# Patient Record
Sex: MALE | Race: WHITE | NOT HISPANIC OR LATINO | Employment: FULL TIME | URBAN - METROPOLITAN AREA
[De-identification: names, ages, dates, MRNs, and addresses within clinical notes are randomized per-mention and may not be internally consistent; named-entity substitution may affect disease eponyms.]

---

## 2017-10-21 ENCOUNTER — HOSPITAL ENCOUNTER (OUTPATIENT)
Dept: LAB | Facility: HOSPITAL | Age: 57
Discharge: HOME | End: 2017-10-21
Attending: INTERNAL MEDICINE | Admitting: INTERNAL MEDICINE

## 2017-10-21 LAB
ALBUMIN SERPL-MCNC: 4.6 G/DL (ref 3.5–4.8)
ALBUMIN/GLOB SERPL: 1.7 {RATIO} (ref 1–1.7)
ALP SERPL-CCNC: 64 IU/L (ref 32–91)
ALT SERPL-CCNC: 26 IU/L (ref 17–63)
ANION GAP SERPL CALC-SCNC: 14.8 MMOL/L (ref 10–20)
AST SERPL-CCNC: 27 IU/L (ref 15–41)
BASOPHILS # BLD AUTO: 0 10*3/UL (ref 0–0.2)
BASOPHILS NFR BLD AUTO: 1 % (ref 0–2)
BILIRUB SERPL-MCNC: 1.4 MG/DL (ref 0.3–1.2)
BILIRUB UR QL STRIP: NEGATIVE MG/DL
BUN SERPL-MCNC: 22 MG/DL (ref 8–20)
BUN/CREAT SERPL: 15.7 (ref 6.2–20.3)
CALCIUM SERPL-MCNC: 9.8 MG/DL (ref 8.9–10.3)
CASTS URNS QL MICRO: NORMAL /[LPF]
CHLORIDE SERPL-SCNC: 98 MMOL/L (ref 101–111)
CHOLEST SERPL-MCNC: 187 MG/DL
CHOLEST/HDLC SERPL: 4.7 {RATIO}
COLOR UR: YELLOW
CONV BACTERIA IN URINE MICRO: NEGATIVE
CONV CLARITY OF URINE: CLEAR
CONV CO2: 30 MMOL/L (ref 22–32)
CONV HYALINE CASTS IN URINE MICRO: 0 /[LPF] (ref 0–5)
CONV LDL CHOLESTEROL DIRECT: 135 MG/DL (ref 0–100)
CONV MICROALBUM.,U,RANDOM: 57 MG/L
CONV PROTEIN IN URINE BY AUTOMATED TEST STRIP: NEGATIVE MG/DL
CONV SMALL ROUND CELLS: NORMAL /[HPF]
CONV TOTAL PROTEIN: 7.3 G/DL (ref 6.1–7.9)
CONV UROBILINOGEN IN URINE BY AUTOMATED TEST STRIP: 1 MG/DL
CREAT 24H UR-MCNC: 79.7 MG/DL
CREAT UR-MCNC: 1.4 MG/DL (ref 0.7–1.2)
CULTURE INDICATED?: NORMAL
DIFFERENTIAL METHOD BLD: (no result)
EOSINOPHIL # BLD AUTO: 0.1 10*3/UL (ref 0–0.3)
EOSINOPHIL # BLD AUTO: 1 % (ref 0–3)
ERYTHROCYTE [DISTWIDTH] IN BLOOD BY AUTOMATED COUNT: 13.3 % (ref 11.5–14.5)
GLOBULIN UR ELPH-MCNC: 2.7 G/DL (ref 2.5–3.8)
GLUCOSE SERPL-MCNC: 95 MG/DL (ref 65–99)
GLUCOSE UR QL: NEGATIVE MG/DL
HCT VFR BLD AUTO: 47.7 % (ref 40–54)
HDLC SERPL-MCNC: 40 MG/DL
HGB BLD-MCNC: 16.5 G/DL (ref 14–18)
HGB UR QL STRIP: NEGATIVE
KETONES UR QL STRIP: NEGATIVE MG/DL
LDLC/HDLC SERPL: 3.4 {RATIO}
LEUKOCYTE ESTERASE UR QL STRIP: NEGATIVE
LIPID INTERPRETATION: ABNORMAL
LYMPHOCYTES # BLD AUTO: 1.5 10*3/UL (ref 0.8–4.8)
LYMPHOCYTES NFR BLD AUTO: 29 % (ref 18–42)
MCH RBC QN AUTO: 32.1 PG (ref 26–32)
MCHC RBC AUTO-ENTMCNC: 34.5 G/DL (ref 32–36)
MCV RBC AUTO: 93 FL (ref 80–94)
MICROALBUMIN/CREAT UR: 71.5 UG/MG
MONOCYTES # BLD AUTO: 0.6 10*3/UL (ref 0.1–1.3)
MONOCYTES NFR BLD AUTO: 11 % (ref 2–11)
NEUTROPHILS # BLD AUTO: 3.1 10*3/UL (ref 2.3–8.6)
NEUTROPHILS NFR BLD AUTO: 58 % (ref 50–75)
NITRITE UR QL STRIP: NEGATIVE
NRBC BLD AUTO-RTO: 0 /100{WBCS}
NRBC/RBC NFR BLD MANUAL: 0 10*3/UL
PH UR STRIP.AUTO: 6.5 [PH] (ref 4.5–8)
PLATELET # BLD AUTO: 202 10*3/UL (ref 150–450)
PMV BLD AUTO: 9 FL (ref 7.4–10.4)
POTASSIUM SERPL-SCNC: 3.8 MMOL/L (ref 3.6–5.1)
PSA SERPL-MCNC: 1.85 NG/ML (ref 0–4)
RBC # BLD AUTO: 5.13 10*6/UL (ref 4.6–6)
RBC #/AREA URNS HPF: 1 /[HPF] (ref 0–3)
SODIUM SERPL-SCNC: 139 MMOL/L (ref 136–144)
SP GR UR: 1.01 (ref 1–1.03)
SPERM URNS QL MICRO: NORMAL /[HPF]
SQUAMOUS SPT QL MICRO: 0 /[HPF] (ref 0–5)
TRIGL SERPL-MCNC: 117 MG/DL
UNIDENT CRYS URNS QL MICRO: NORMAL /[HPF]
URATE SERPL-MCNC: 4.5 MG/DL (ref 4.8–8.7)
VLDLC SERPL CALC-MCNC: 12.4 MG/DL
WBC # BLD AUTO: 5.2 10*3/UL (ref 4.5–11.5)
WBC #/AREA URNS HPF: 0 /[HPF] (ref 0–5)
YEAST SPEC QL WET PREP: NORMAL /[HPF]

## 2018-05-12 ENCOUNTER — HOSPITAL ENCOUNTER (OUTPATIENT)
Dept: LAB | Facility: HOSPITAL | Age: 58
Discharge: HOME | End: 2018-05-12
Attending: INTERNAL MEDICINE | Admitting: INTERNAL MEDICINE

## 2018-05-12 LAB
ANION GAP SERPL CALC-SCNC: 12 MMOL/L (ref 10–20)
BILIRUB UR QL STRIP: NEGATIVE MG/DL
BUN SERPL-MCNC: 20 MG/DL (ref 8–20)
BUN/CREAT SERPL: 15.4 (ref 6.2–20.3)
CALCIUM SERPL-MCNC: 9.5 MG/DL (ref 8.9–10.3)
CASTS URNS QL MICRO: ABNORMAL /[LPF]
CHLORIDE SERPL-SCNC: 102 MMOL/L (ref 101–111)
COLOR UR: YELLOW
CONV BACTERIA IN URINE MICRO: NEGATIVE
CONV CLARITY OF URINE: CLEAR
CONV CO2: 29 MMOL/L (ref 22–32)
CONV HYALINE CASTS IN URINE MICRO: 0 /[LPF] (ref 0–5)
CONV PROTEIN IN URINE BY AUTOMATED TEST STRIP: 100 MG/DL
CONV SMALL ROUND CELLS: ABNORMAL /[HPF]
CONV UROBILINOGEN IN URINE BY AUTOMATED TEST STRIP: 1 MG/DL
CREAT 24H UR-MCNC: 107.4 MG/DL
CREAT UR-MCNC: 1.3 MG/DL (ref 0.7–1.2)
CULTURE INDICATED?: ABNORMAL
ERYTHROCYTE [DISTWIDTH] IN BLOOD BY AUTOMATED COUNT: 13.5 % (ref 11.5–14.5)
GLUCOSE SERPL-MCNC: 103 MG/DL (ref 65–99)
GLUCOSE UR QL: NEGATIVE MG/DL
HCT VFR BLD AUTO: 47.6 % (ref 40–54)
HGB BLD-MCNC: 16.2 G/DL (ref 14–18)
HGB UR QL STRIP: NEGATIVE
KETONES UR QL STRIP: NEGATIVE MG/DL
LEUKOCYTE ESTERASE UR QL STRIP: NEGATIVE
MCH RBC QN AUTO: 31 PG (ref 26–32)
MCHC RBC AUTO-ENTMCNC: 34 G/DL (ref 32–36)
MCV RBC AUTO: 91.3 FL (ref 80–94)
NITRITE UR QL STRIP: NEGATIVE
PH UR STRIP.AUTO: 6 [PH] (ref 4.5–8)
PLATELET # BLD AUTO: 201 10*3/UL (ref 150–450)
PMV BLD AUTO: 8.8 FL (ref 7.4–10.4)
POTASSIUM SERPL-SCNC: 4 MMOL/L (ref 3.6–5.1)
PROT UR-MCNC: 57 MG/DL
PROT/CREAT UR: 0.5 MG/MG (ref 0–22)
RBC # BLD AUTO: 5.21 10*6/UL (ref 4.6–6)
RBC #/AREA URNS HPF: 1 /[HPF] (ref 0–3)
SODIUM SERPL-SCNC: 139 MMOL/L (ref 136–144)
SP GR UR: 1.01 (ref 1–1.03)
SPERM URNS QL MICRO: ABNORMAL /[HPF]
SQUAMOUS SPT QL MICRO: 0 /[HPF] (ref 0–5)
UNIDENT CRYS URNS QL MICRO: ABNORMAL /[HPF]
WBC # BLD AUTO: 5 10*3/UL (ref 4.5–11.5)
WBC #/AREA URNS HPF: 0 /[HPF] (ref 0–5)
YEAST SPEC QL WET PREP: ABNORMAL /[HPF]

## 2018-10-20 ENCOUNTER — HOSPITAL ENCOUNTER (OUTPATIENT)
Dept: LAB | Facility: HOSPITAL | Age: 58
Discharge: HOME | End: 2018-10-20
Attending: FAMILY MEDICINE | Admitting: FAMILY MEDICINE

## 2018-10-20 LAB
ALBUMIN SERPL-MCNC: 4 G/DL (ref 3.5–4.8)
ALBUMIN/GLOB SERPL: 1.7 {RATIO} (ref 1–1.7)
ALP SERPL-CCNC: 74 IU/L (ref 32–91)
ALT SERPL-CCNC: 32 IU/L (ref 17–63)
ANION GAP SERPL CALC-SCNC: 14 MMOL/L (ref 10–20)
AST SERPL-CCNC: 26 IU/L (ref 15–41)
BASOPHILS # BLD AUTO: 0 10*3/UL (ref 0–0.2)
BASOPHILS NFR BLD AUTO: 1 % (ref 0–2)
BILIRUB SERPL-MCNC: 0.9 MG/DL (ref 0.3–1.2)
BILIRUB UR QL STRIP: NEGATIVE MG/DL
BUN SERPL-MCNC: 17 MG/DL (ref 8–20)
BUN/CREAT SERPL: 13.1 (ref 6.2–20.3)
CALCIUM SERPL-MCNC: 9.6 MG/DL (ref 8.9–10.3)
CASTS URNS QL MICRO: ABNORMAL /[LPF]
CHLORIDE SERPL-SCNC: 101 MMOL/L (ref 101–111)
CHOLEST SERPL-MCNC: 165 MG/DL
CHOLEST/HDLC SERPL: 4.8 {RATIO}
COLOR UR: YELLOW
CONV BACTERIA IN URINE MICRO: NEGATIVE
CONV CLARITY OF URINE: CLEAR
CONV CO2: 29 MMOL/L (ref 22–32)
CONV HYALINE CASTS IN URINE MICRO: 0 /[LPF] (ref 0–5)
CONV LDL CHOLESTEROL DIRECT: 111 MG/DL (ref 0–100)
CONV PROTEIN IN URINE BY AUTOMATED TEST STRIP: 100 MG/DL
CONV SMALL ROUND CELLS: ABNORMAL /[HPF]
CONV TOTAL PROTEIN: 6.3 G/DL (ref 6.1–7.9)
CONV UROBILINOGEN IN URINE BY AUTOMATED TEST STRIP: 1 MG/DL
CREAT UR-MCNC: 1.3 MG/DL (ref 0.7–1.2)
CULTURE INDICATED?: ABNORMAL
DIFFERENTIAL METHOD BLD: (no result)
EOSINOPHIL # BLD AUTO: 0.1 10*3/UL (ref 0–0.3)
EOSINOPHIL # BLD AUTO: 2 % (ref 0–3)
ERYTHROCYTE [DISTWIDTH] IN BLOOD BY AUTOMATED COUNT: 13.3 % (ref 11.5–14.5)
GLOBULIN UR ELPH-MCNC: 2.3 G/DL (ref 2.5–3.8)
GLUCOSE SERPL-MCNC: 111 MG/DL (ref 65–99)
GLUCOSE UR QL: NEGATIVE MG/DL
HCT VFR BLD AUTO: 47.5 % (ref 40–54)
HDLC SERPL-MCNC: 34 MG/DL
HGB BLD-MCNC: 16 G/DL (ref 14–18)
HGB UR QL STRIP: NEGATIVE
KETONES UR QL STRIP: NEGATIVE MG/DL
LDLC/HDLC SERPL: 3.2 {RATIO}
LEUKOCYTE ESTERASE UR QL STRIP: NEGATIVE
LIPID INTERPRETATION: ABNORMAL
LYMPHOCYTES # BLD AUTO: 1.3 10*3/UL (ref 0.8–4.8)
LYMPHOCYTES NFR BLD AUTO: 32 % (ref 18–42)
MCH RBC QN AUTO: 30.8 PG (ref 26–32)
MCHC RBC AUTO-ENTMCNC: 33.6 G/DL (ref 32–36)
MCV RBC AUTO: 91.7 FL (ref 80–94)
MONOCYTES # BLD AUTO: 0.5 10*3/UL (ref 0.1–1.3)
MONOCYTES NFR BLD AUTO: 11 % (ref 2–11)
NEUTROPHILS # BLD AUTO: 2.2 10*3/UL (ref 2.3–8.6)
NEUTROPHILS NFR BLD AUTO: 54 % (ref 50–75)
NITRITE UR QL STRIP: NEGATIVE
NRBC BLD AUTO-RTO: 0 /100{WBCS}
NRBC/RBC NFR BLD MANUAL: 0 10*3/UL
PH UR STRIP.AUTO: 7 [PH] (ref 4.5–8)
PLATELET # BLD AUTO: 188 10*3/UL (ref 150–450)
PMV BLD AUTO: 8.9 FL (ref 7.4–10.4)
POTASSIUM SERPL-SCNC: 4 MMOL/L (ref 3.6–5.1)
PSA SERPL-MCNC: 1.27 NG/ML (ref 0–4)
RBC # BLD AUTO: 5.18 10*6/UL (ref 4.6–6)
RBC #/AREA URNS HPF: 1 /[HPF] (ref 0–3)
SODIUM SERPL-SCNC: 140 MMOL/L (ref 136–144)
SP GR UR: 1.02 (ref 1–1.03)
SPERM URNS QL MICRO: ABNORMAL /[HPF]
SQUAMOUS SPT QL MICRO: 0 /[HPF] (ref 0–5)
TRIGL SERPL-MCNC: 127 MG/DL
TSH SERPL-ACNC: 2.22 UIU/ML (ref 0.34–5.6)
UNIDENT CRYS URNS QL MICRO: ABNORMAL /[HPF]
URATE SERPL-MCNC: 4.3 MG/DL (ref 4.8–8.7)
VIT B12 SERPL-MCNC: 509 PG/ML (ref 180–914)
VLDLC SERPL CALC-MCNC: 19.3 MG/DL
WBC # BLD AUTO: 4.1 10*3/UL (ref 4.5–11.5)
WBC #/AREA URNS HPF: 0 /[HPF] (ref 0–5)
YEAST SPEC QL WET PREP: ABNORMAL /[HPF]

## 2018-10-22 LAB — 25(OH)D3 SERPL-MCNC: 57 NG/ML (ref 30–100)

## 2018-10-24 LAB
TESTOST FREE SERPL-MCNC: 57.2 PG/ML (ref 35–155)
TESTOST SERPL-MCNC: 349 NG/DL (ref 250–1100)

## 2019-03-16 ENCOUNTER — HOSPITAL ENCOUNTER (OUTPATIENT)
Dept: LAB | Facility: HOSPITAL | Age: 59
Discharge: HOME | End: 2019-03-16
Attending: INTERNAL MEDICINE | Admitting: INTERNAL MEDICINE

## 2019-03-16 LAB
ANION GAP SERPL CALC-SCNC: 15.5 MMOL/L (ref 10–20)
BASOPHILS # BLD AUTO: 0 10*3/UL (ref 0–0.2)
BASOPHILS NFR BLD AUTO: 1 % (ref 0–2)
BILIRUB UR QL STRIP: NEGATIVE MG/DL
BUN SERPL-MCNC: 17 MG/DL (ref 8–20)
BUN/CREAT SERPL: 14.2 (ref 6.2–20.3)
CALCIUM SERPL-MCNC: 8.8 MG/DL (ref 8.9–10.3)
CASTS URNS QL MICRO: ABNORMAL /[LPF]
CHLORIDE SERPL-SCNC: 102 MMOL/L (ref 101–111)
COLOR UR: YELLOW
CONV BACTERIA IN URINE MICRO: NEGATIVE
CONV CLARITY OF URINE: CLEAR
CONV CO2: 24 MMOL/L (ref 22–32)
CONV HYALINE CASTS IN URINE MICRO: 1 /[LPF] (ref 0–5)
CONV PROTEIN IN URINE BY AUTOMATED TEST STRIP: 30 MG/DL
CONV SMALL ROUND CELLS: ABNORMAL /[HPF]
CONV UROBILINOGEN IN URINE BY AUTOMATED TEST STRIP: 0.2 MG/DL
CREAT 24H UR-MCNC: 74.6 MG/DL
CREAT UR-MCNC: 1.2 MG/DL (ref 0.7–1.2)
CULTURE INDICATED?: ABNORMAL
DIFFERENTIAL METHOD BLD: (no result)
EOSINOPHIL # BLD AUTO: 0.1 10*3/UL (ref 0–0.3)
EOSINOPHIL # BLD AUTO: 2 % (ref 0–3)
ERYTHROCYTE [DISTWIDTH] IN BLOOD BY AUTOMATED COUNT: 13.5 % (ref 11.5–14.5)
GLUCOSE SERPL-MCNC: 117 MG/DL (ref 65–99)
GLUCOSE UR QL: NEGATIVE MG/DL
HCT VFR BLD AUTO: 46.4 % (ref 40–54)
HGB BLD-MCNC: 15.7 G/DL (ref 14–18)
HGB UR QL STRIP: NEGATIVE
KETONES UR QL STRIP: NEGATIVE MG/DL
LEUKOCYTE ESTERASE UR QL STRIP: NEGATIVE
LYMPHOCYTES # BLD AUTO: 1.2 10*3/UL (ref 0.8–4.8)
LYMPHOCYTES NFR BLD AUTO: 32 % (ref 18–42)
MCH RBC QN AUTO: 31.4 PG (ref 26–32)
MCHC RBC AUTO-ENTMCNC: 33.8 G/DL (ref 32–36)
MCV RBC AUTO: 92.8 FL (ref 80–94)
MONOCYTES # BLD AUTO: 0.6 10*3/UL (ref 0.1–1.3)
MONOCYTES NFR BLD AUTO: 16 % (ref 2–11)
NEUTROPHILS # BLD AUTO: 1.8 10*3/UL (ref 2.3–8.6)
NEUTROPHILS NFR BLD AUTO: 49 % (ref 50–75)
NITRITE UR QL STRIP: NEGATIVE
NRBC BLD AUTO-RTO: 0 /100{WBCS}
NRBC/RBC NFR BLD MANUAL: 0 10*3/UL
PH UR STRIP.AUTO: 6 [PH] (ref 4.5–8)
PLATELET # BLD AUTO: 184 10*3/UL (ref 150–450)
PMV BLD AUTO: 8.9 FL (ref 7.4–10.4)
POTASSIUM SERPL-SCNC: 4.5 MMOL/L (ref 3.6–5.1)
PROT UR-MCNC: 43 MG/DL
PROT/CREAT UR: 0.6 MG/MG (ref 0–22)
RBC # BLD AUTO: 5 10*6/UL (ref 4.6–6)
RBC #/AREA URNS HPF: 0 /[HPF] (ref 0–3)
SODIUM SERPL-SCNC: 137 MMOL/L (ref 136–144)
SP GR UR: 1.01 (ref 1–1.03)
SPERM URNS QL MICRO: ABNORMAL /[HPF]
SQUAMOUS SPT QL MICRO: 0 /[HPF] (ref 0–5)
UNIDENT CRYS URNS QL MICRO: ABNORMAL /[HPF]
WBC # BLD AUTO: 3.6 10*3/UL (ref 4.5–11.5)
WBC #/AREA URNS HPF: 1 /[HPF] (ref 0–5)
YEAST SPEC QL WET PREP: ABNORMAL /[HPF]

## 2019-09-28 ENCOUNTER — LAB (OUTPATIENT)
Dept: LAB | Facility: HOSPITAL | Age: 59
End: 2019-09-28

## 2019-09-28 ENCOUNTER — TRANSCRIBE ORDERS (OUTPATIENT)
Dept: ADMINISTRATIVE | Facility: HOSPITAL | Age: 59
End: 2019-09-28

## 2019-09-28 DIAGNOSIS — I10 ESSENTIAL HYPERTENSION: ICD-10-CM

## 2019-09-28 DIAGNOSIS — Z00.00 ROUTINE HEALTH MAINTENANCE: ICD-10-CM

## 2019-09-28 DIAGNOSIS — Z00.00 ROUTINE HEALTH MAINTENANCE: Primary | ICD-10-CM

## 2019-09-28 LAB
ALBUMIN SERPL-MCNC: 4 G/DL (ref 3.5–4.8)
ALBUMIN UR-MCNC: 374 MG/L
ALBUMIN/GLOB SERPL: 1.8 G/DL (ref 1–1.7)
ALP SERPL-CCNC: 65 U/L (ref 32–91)
ALT SERPL W P-5'-P-CCNC: 43 U/L (ref 17–63)
ANION GAP SERPL CALCULATED.3IONS-SCNC: 14.1 MMOL/L (ref 5–15)
ARTICHOKE IGE QN: 173 MG/DL (ref 0–100)
AST SERPL-CCNC: 28 U/L (ref 15–41)
BASOPHILS # BLD AUTO: 0 10*3/MM3 (ref 0–0.2)
BASOPHILS NFR BLD AUTO: 0.8 % (ref 0–1.5)
BILIRUB SERPL-MCNC: 1.1 MG/DL (ref 0.3–1.2)
BUN BLD-MCNC: 11 MG/DL (ref 8–20)
BUN/CREAT SERPL: 10 (ref 6.2–20.3)
CALCIUM SPEC-SCNC: 9.2 MG/DL (ref 8.9–10.3)
CHLORIDE SERPL-SCNC: 105 MMOL/L (ref 101–111)
CHOLEST SERPL-MCNC: 236 MG/DL
CO2 SERPL-SCNC: 27 MMOL/L (ref 22–32)
CREAT BLD-MCNC: 1.1 MG/DL (ref 0.7–1.2)
DEPRECATED RDW RBC AUTO: 45.9 FL (ref 37–54)
EOSINOPHIL # BLD AUTO: 0.1 10*3/MM3 (ref 0–0.4)
EOSINOPHIL NFR BLD AUTO: 2.2 % (ref 0.3–6.2)
ERYTHROCYTE [DISTWIDTH] IN BLOOD BY AUTOMATED COUNT: 14 % (ref 12.3–15.4)
GFR SERPL CREATININE-BSD FRML MDRD: 68 ML/MIN/1.73
GLOBULIN UR ELPH-MCNC: 2.2 GM/DL (ref 2.5–3.8)
GLUCOSE BLD-MCNC: 100 MG/DL (ref 65–99)
HCT VFR BLD AUTO: 46.3 % (ref 37.5–51)
HDLC SERPL QL: 7.15
HDLC SERPL-MCNC: 33 MG/DL
HGB BLD-MCNC: 15.8 G/DL (ref 13–17.7)
LDLC/HDLC SERPL: 4.81 {RATIO}
LYMPHOCYTES # BLD AUTO: 1.2 10*3/MM3 (ref 0.7–3.1)
LYMPHOCYTES NFR BLD AUTO: 28 % (ref 19.6–45.3)
MCH RBC QN AUTO: 31.7 PG (ref 26.6–33)
MCHC RBC AUTO-ENTMCNC: 34.2 G/DL (ref 31.5–35.7)
MCV RBC AUTO: 92.9 FL (ref 79–97)
MONOCYTES # BLD AUTO: 0.6 10*3/MM3 (ref 0.1–0.9)
MONOCYTES NFR BLD AUTO: 13.6 % (ref 5–12)
NEUTROPHILS # BLD AUTO: 2.4 10*3/MM3 (ref 1.7–7)
NEUTROPHILS NFR BLD AUTO: 55.4 % (ref 42.7–76)
NRBC BLD AUTO-RTO: 0.1 /100 WBC (ref 0–0.2)
PLATELET # BLD AUTO: 191 10*3/MM3 (ref 140–450)
PMV BLD AUTO: 8.8 FL (ref 6–12)
POTASSIUM BLD-SCNC: 4.1 MMOL/L (ref 3.6–5.1)
PROT SERPL-MCNC: 6.2 G/DL (ref 6.1–7.9)
PSA SERPL-MCNC: 1.34 NG/ML (ref 0–4)
RBC # BLD AUTO: 4.98 10*6/MM3 (ref 4.14–5.8)
SODIUM BLD-SCNC: 142 MMOL/L (ref 136–144)
T3FREE SERPL-MCNC: 3.37 PG/ML (ref 2.39–6.79)
T4 FREE SERPL-MCNC: 0.56 NG/DL (ref 0.58–1.64)
TRIGL SERPL-MCNC: 222 MG/DL
TSH SERPL DL<=0.05 MIU/L-ACNC: 2.75 UIU/ML (ref 0.34–5.6)
VIT B12 BLD-MCNC: 518 PG/ML (ref 180–914)
VLDLC SERPL-MCNC: 44.4 MG/DL
WBC NRBC COR # BLD: 4.2 10*3/MM3 (ref 3.4–10.8)

## 2019-09-28 PROCEDURE — 84481 FREE ASSAY (FT-3): CPT

## 2019-09-28 PROCEDURE — 84439 ASSAY OF FREE THYROXINE: CPT

## 2019-09-28 PROCEDURE — 84402 ASSAY OF FREE TESTOSTERONE: CPT

## 2019-09-28 PROCEDURE — 84443 ASSAY THYROID STIM HORMONE: CPT

## 2019-09-28 PROCEDURE — 36415 COLL VENOUS BLD VENIPUNCTURE: CPT

## 2019-09-28 PROCEDURE — 80061 LIPID PANEL: CPT

## 2019-09-28 PROCEDURE — 85025 COMPLETE CBC W/AUTO DIFF WBC: CPT

## 2019-09-28 PROCEDURE — 82306 VITAMIN D 25 HYDROXY: CPT

## 2019-09-28 PROCEDURE — 82043 UR ALBUMIN QUANTITATIVE: CPT

## 2019-09-28 PROCEDURE — G0103 PSA SCREENING: HCPCS

## 2019-09-28 PROCEDURE — 80053 COMPREHEN METABOLIC PANEL: CPT

## 2019-09-28 PROCEDURE — 82607 VITAMIN B-12: CPT

## 2019-09-28 PROCEDURE — 84403 ASSAY OF TOTAL TESTOSTERONE: CPT

## 2019-09-30 LAB — 25(OH)D3 SERPL-MCNC: 37.7 NG/ML (ref 30–100)

## 2019-10-02 LAB
TESTOST FREE SERPL-MCNC: 7.4 PG/ML (ref 6.6–18.1)
TESTOST SERPL-MCNC: 347.2 NG/DL (ref 264–916)

## 2020-10-21 ENCOUNTER — LAB (OUTPATIENT)
Dept: LAB | Facility: HOSPITAL | Age: 60
End: 2020-10-21

## 2020-10-21 ENCOUNTER — TRANSCRIBE ORDERS (OUTPATIENT)
Dept: ADMINISTRATIVE | Facility: HOSPITAL | Age: 60
End: 2020-10-21

## 2020-10-21 DIAGNOSIS — N18.2 CHRONIC KIDNEY DISEASE, STAGE II (MILD): ICD-10-CM

## 2020-10-21 DIAGNOSIS — N18.2 CHRONIC KIDNEY DISEASE, STAGE II (MILD): Primary | ICD-10-CM

## 2020-10-21 DIAGNOSIS — R80.9 PROTEINURIA, UNSPECIFIED TYPE: ICD-10-CM

## 2020-10-21 DIAGNOSIS — I10 ESSENTIAL HYPERTENSION, MALIGNANT: ICD-10-CM

## 2020-10-21 LAB
ANION GAP SERPL CALCULATED.3IONS-SCNC: 9.4 MMOL/L (ref 5–15)
BILIRUB UR QL STRIP: NEGATIVE
BUN SERPL-MCNC: 22 MG/DL (ref 8–23)
BUN/CREAT SERPL: 17.1 (ref 7–25)
CALCIUM SPEC-SCNC: 9.5 MG/DL (ref 8.6–10.5)
CHLORIDE SERPL-SCNC: 103 MMOL/L (ref 98–107)
CLARITY UR: CLEAR
CO2 SERPL-SCNC: 26.6 MMOL/L (ref 22–29)
COLOR UR: YELLOW
CREAT SERPL-MCNC: 1.29 MG/DL (ref 0.76–1.27)
CREAT UR-MCNC: 39.6 MG/DL
GFR SERPL CREATININE-BSD FRML MDRD: 56 ML/MIN/1.73
GLUCOSE SERPL-MCNC: 123 MG/DL (ref 65–99)
GLUCOSE UR STRIP-MCNC: NEGATIVE MG/DL
HGB UR QL STRIP.AUTO: NEGATIVE
KETONES UR QL STRIP: NEGATIVE
LEUKOCYTE ESTERASE UR QL STRIP.AUTO: NEGATIVE
NITRITE UR QL STRIP: NEGATIVE
PH UR STRIP.AUTO: 7 [PH] (ref 5–8)
POTASSIUM SERPL-SCNC: 4.3 MMOL/L (ref 3.5–5.2)
PROT UR QL STRIP: ABNORMAL
PROT UR-MCNC: 17 MG/DL
SODIUM SERPL-SCNC: 139 MMOL/L (ref 136–145)
SP GR UR STRIP: 1.01 (ref 1–1.03)
URATE SERPL-MCNC: 4.2 MG/DL (ref 3.4–7)
UROBILINOGEN UR QL STRIP: ABNORMAL

## 2020-10-21 PROCEDURE — 80048 BASIC METABOLIC PNL TOTAL CA: CPT

## 2020-10-21 PROCEDURE — 81003 URINALYSIS AUTO W/O SCOPE: CPT

## 2020-10-21 PROCEDURE — 82570 ASSAY OF URINE CREATININE: CPT

## 2020-10-21 PROCEDURE — 84550 ASSAY OF BLOOD/URIC ACID: CPT

## 2020-10-21 PROCEDURE — 84156 ASSAY OF PROTEIN URINE: CPT

## 2020-10-21 PROCEDURE — 36415 COLL VENOUS BLD VENIPUNCTURE: CPT

## 2020-11-19 RX ORDER — CARVEDILOL 3.12 MG/1
TABLET ORAL
Qty: 1 TABLET | Refills: 0 | Status: SHIPPED | OUTPATIENT
Start: 2020-11-19

## 2020-11-19 RX ORDER — ALLOPURINOL 300 MG/1
TABLET ORAL
Qty: 90 TABLET | Refills: 3 | Status: SHIPPED | OUTPATIENT
Start: 2020-11-19

## 2020-11-19 RX ORDER — CARVEDILOL 25 MG/1
25 TABLET ORAL 2 TIMES DAILY
Qty: 180 TABLET | Refills: 3 | Status: SHIPPED | OUTPATIENT
Start: 2020-11-19

## 2021-06-04 RX ORDER — ATORVASTATIN CALCIUM 20 MG/1
20 TABLET, FILM COATED ORAL DAILY
Qty: 90 TABLET | Refills: 0 | Status: CANCELLED | OUTPATIENT
Start: 2021-06-04

## 2021-06-14 RX ORDER — ATORVASTATIN CALCIUM 20 MG/1
20 TABLET, FILM COATED ORAL DAILY
Qty: 90 TABLET | Refills: 0 | Status: CANCELLED | OUTPATIENT
Start: 2021-06-04

## 2021-06-30 RX ORDER — ATORVASTATIN CALCIUM 20 MG/1
20 TABLET, FILM COATED ORAL DAILY
Qty: 90 TABLET | Refills: 0 | Status: CANCELLED | OUTPATIENT
Start: 2021-06-04

## 2021-07-08 RX ORDER — ATORVASTATIN CALCIUM 20 MG/1
20 TABLET, FILM COATED ORAL DAILY
Qty: 90 TABLET | Refills: 0 | Status: CANCELLED | OUTPATIENT
Start: 2021-06-04

## 2021-08-28 ENCOUNTER — LAB (OUTPATIENT)
Dept: LAB | Facility: HOSPITAL | Age: 61
End: 2021-08-28

## 2021-08-28 ENCOUNTER — TRANSCRIBE ORDERS (OUTPATIENT)
Dept: ADMINISTRATIVE | Facility: HOSPITAL | Age: 61
End: 2021-08-28

## 2021-08-28 DIAGNOSIS — IMO0002 VITAMIN DISEASE: ICD-10-CM

## 2021-08-28 DIAGNOSIS — Z00.00 ROUTINE GENERAL MEDICAL EXAMINATION AT A HEALTH CARE FACILITY: ICD-10-CM

## 2021-08-28 DIAGNOSIS — I10 HYPERTENSION, UNSPECIFIED TYPE: ICD-10-CM

## 2021-08-28 DIAGNOSIS — R53.83 TIREDNESS: Primary | ICD-10-CM

## 2021-08-28 DIAGNOSIS — R53.83 TIREDNESS: ICD-10-CM

## 2021-08-28 LAB
25(OH)D3 SERPL-MCNC: 61.2 NG/ML (ref 30–100)
ALBUMIN SERPL-MCNC: 4.8 G/DL (ref 3.5–5.2)
ALBUMIN UR-MCNC: 17.3 MG/DL
ALBUMIN/GLOB SERPL: 2.7 G/DL
ALP SERPL-CCNC: 67 U/L (ref 39–117)
ALT SERPL W P-5'-P-CCNC: 52 U/L (ref 1–41)
ANION GAP SERPL CALCULATED.3IONS-SCNC: 9.4 MMOL/L (ref 5–15)
AST SERPL-CCNC: 31 U/L (ref 1–40)
BACTERIA UR QL AUTO: NORMAL /HPF
BASOPHILS # BLD AUTO: 0.04 10*3/MM3 (ref 0–0.2)
BASOPHILS NFR BLD AUTO: 0.9 % (ref 0–1.5)
BILIRUB SERPL-MCNC: 0.7 MG/DL (ref 0–1.2)
BILIRUB UR QL STRIP: NEGATIVE
BUN SERPL-MCNC: 22 MG/DL (ref 8–23)
BUN/CREAT SERPL: 17.7 (ref 7–25)
CALCIUM SPEC-SCNC: 9.5 MG/DL (ref 8.6–10.5)
CHLORIDE SERPL-SCNC: 103 MMOL/L (ref 98–107)
CHOLEST SERPL-MCNC: 217 MG/DL (ref 0–200)
CLARITY UR: CLEAR
CO2 SERPL-SCNC: 28.6 MMOL/L (ref 22–29)
COLOR UR: YELLOW
CREAT SERPL-MCNC: 1.24 MG/DL (ref 0.76–1.27)
DEPRECATED RDW RBC AUTO: 44.9 FL (ref 37–54)
EOSINOPHIL # BLD AUTO: 0.1 10*3/MM3 (ref 0–0.4)
EOSINOPHIL NFR BLD AUTO: 2.1 % (ref 0.3–6.2)
ERYTHROCYTE [DISTWIDTH] IN BLOOD BY AUTOMATED COUNT: 12.9 % (ref 12.3–15.4)
FOLATE SERPL-MCNC: >20 NG/ML (ref 4.78–24.2)
GFR SERPL CREATININE-BSD FRML MDRD: 59 ML/MIN/1.73
GLOBULIN UR ELPH-MCNC: 1.8 GM/DL
GLUCOSE SERPL-MCNC: 108 MG/DL (ref 65–99)
GLUCOSE UR STRIP-MCNC: NEGATIVE MG/DL
HCT VFR BLD AUTO: 48.2 % (ref 37.5–51)
HDLC SERPL-MCNC: 30 MG/DL (ref 40–60)
HGB BLD-MCNC: 16.1 G/DL (ref 13–17.7)
HGB UR QL STRIP.AUTO: NEGATIVE
HYALINE CASTS UR QL AUTO: NORMAL /LPF
IMM GRANULOCYTES # BLD AUTO: 0.01 10*3/MM3 (ref 0–0.05)
IMM GRANULOCYTES NFR BLD AUTO: 0.2 % (ref 0–0.5)
KETONES UR QL STRIP: NEGATIVE
LDLC SERPL CALC-MCNC: 153 MG/DL (ref 0–100)
LDLC/HDLC SERPL: 5.02 {RATIO}
LEUKOCYTE ESTERASE UR QL STRIP.AUTO: NEGATIVE
LYMPHOCYTES # BLD AUTO: 1.39 10*3/MM3 (ref 0.7–3.1)
LYMPHOCYTES NFR BLD AUTO: 29.7 % (ref 19.6–45.3)
MCH RBC QN AUTO: 31.4 PG (ref 26.6–33)
MCHC RBC AUTO-ENTMCNC: 33.4 G/DL (ref 31.5–35.7)
MCV RBC AUTO: 94.1 FL (ref 79–97)
MONOCYTES # BLD AUTO: 0.58 10*3/MM3 (ref 0.1–0.9)
MONOCYTES NFR BLD AUTO: 12.4 % (ref 5–12)
NEUTROPHILS NFR BLD AUTO: 2.56 10*3/MM3 (ref 1.7–7)
NEUTROPHILS NFR BLD AUTO: 54.7 % (ref 42.7–76)
NITRITE UR QL STRIP: NEGATIVE
NRBC BLD AUTO-RTO: 0 /100 WBC (ref 0–0.2)
PH UR STRIP.AUTO: 6.5 [PH] (ref 5–8)
PLATELET # BLD AUTO: 183 10*3/MM3 (ref 140–450)
PMV BLD AUTO: 11.1 FL (ref 6–12)
POTASSIUM SERPL-SCNC: 4.8 MMOL/L (ref 3.5–5.2)
PROT SERPL-MCNC: 6.6 G/DL (ref 6–8.5)
PROT UR QL STRIP: ABNORMAL
PSA SERPL-MCNC: 0.89 NG/ML (ref 0–4)
RBC # BLD AUTO: 5.12 10*6/MM3 (ref 4.14–5.8)
RBC # UR: NORMAL /HPF
REF LAB TEST METHOD: NORMAL
SODIUM SERPL-SCNC: 141 MMOL/L (ref 136–145)
SP GR UR STRIP: 1.02 (ref 1–1.03)
SQUAMOUS #/AREA URNS HPF: NORMAL /HPF
T3FREE SERPL-MCNC: 2.95 PG/ML (ref 2–4.4)
T4 FREE SERPL-MCNC: 0.85 NG/DL (ref 0.93–1.7)
TRIGL SERPL-MCNC: 182 MG/DL (ref 0–150)
TSH SERPL DL<=0.05 MIU/L-ACNC: 3.22 UIU/ML (ref 0.27–4.2)
URATE SERPL-MCNC: 4.8 MG/DL (ref 3.4–7)
UROBILINOGEN UR QL STRIP: ABNORMAL
VIT B12 BLD-MCNC: 1151 PG/ML (ref 211–946)
VLDLC SERPL-MCNC: 34 MG/DL (ref 5–40)
WBC # BLD AUTO: 4.68 10*3/MM3 (ref 3.4–10.8)
WBC UR QL AUTO: NORMAL /HPF

## 2021-08-28 PROCEDURE — 84550 ASSAY OF BLOOD/URIC ACID: CPT

## 2021-08-28 PROCEDURE — 84207 ASSAY OF VITAMIN B-6: CPT

## 2021-08-28 PROCEDURE — 36415 COLL VENOUS BLD VENIPUNCTURE: CPT

## 2021-08-28 PROCEDURE — 84481 FREE ASSAY (FT-3): CPT

## 2021-08-28 PROCEDURE — 82607 VITAMIN B-12: CPT

## 2021-08-28 PROCEDURE — 82043 UR ALBUMIN QUANTITATIVE: CPT

## 2021-08-28 PROCEDURE — 82746 ASSAY OF FOLIC ACID SERUM: CPT

## 2021-08-28 PROCEDURE — 80053 COMPREHEN METABOLIC PANEL: CPT

## 2021-08-28 PROCEDURE — 83036 HEMOGLOBIN GLYCOSYLATED A1C: CPT

## 2021-08-28 PROCEDURE — 84439 ASSAY OF FREE THYROXINE: CPT

## 2021-08-28 PROCEDURE — 85025 COMPLETE CBC W/AUTO DIFF WBC: CPT

## 2021-08-28 PROCEDURE — 82306 VITAMIN D 25 HYDROXY: CPT

## 2021-08-28 PROCEDURE — 81001 URINALYSIS AUTO W/SCOPE: CPT

## 2021-08-28 PROCEDURE — 84443 ASSAY THYROID STIM HORMONE: CPT

## 2021-08-28 PROCEDURE — G0103 PSA SCREENING: HCPCS

## 2021-08-28 PROCEDURE — 84446 ASSAY OF VITAMIN E: CPT

## 2021-08-28 PROCEDURE — 80061 LIPID PANEL: CPT

## 2021-08-30 LAB — HBA1C MFR BLD: 5.9 % (ref 3.5–5.6)

## 2021-09-01 LAB
A-TOCOPHEROL VIT E SERPL-MCNC: 29.3 MG/L (ref 9–29)
GAMMA TOCOPHEROL SERPL-MCNC: 0.3 MG/L (ref 0.5–4.9)

## 2021-10-22 ENCOUNTER — TRANSCRIBE ORDERS (OUTPATIENT)
Dept: ADMINISTRATIVE | Facility: HOSPITAL | Age: 61
End: 2021-10-22

## 2021-10-22 ENCOUNTER — LAB (OUTPATIENT)
Dept: LAB | Facility: HOSPITAL | Age: 61
End: 2021-10-22

## 2021-10-22 DIAGNOSIS — N18.30 STAGE 3 CHRONIC KIDNEY DISEASE, UNSPECIFIED WHETHER STAGE 3A OR 3B CKD (HCC): Primary | ICD-10-CM

## 2021-10-22 DIAGNOSIS — N18.30 STAGE 3 CHRONIC KIDNEY DISEASE, UNSPECIFIED WHETHER STAGE 3A OR 3B CKD (HCC): ICD-10-CM

## 2021-10-22 LAB
25(OH)D3 SERPL-MCNC: 67.2 NG/ML
ALBUMIN SERPL-MCNC: 4.7 G/DL (ref 3.5–5.2)
ALBUMIN/GLOB SERPL: 2.1 G/DL
ALP SERPL-CCNC: 58 U/L (ref 39–117)
ALT SERPL W P-5'-P-CCNC: 38 U/L (ref 1–41)
ANION GAP SERPL CALCULATED.3IONS-SCNC: 12.2 MMOL/L (ref 5–15)
AST SERPL-CCNC: 27 U/L (ref 1–40)
BACTERIA UR QL AUTO: NORMAL /HPF
BASOPHILS # BLD AUTO: 0.03 10*3/MM3 (ref 0–0.2)
BASOPHILS NFR BLD AUTO: 0.7 % (ref 0–1.5)
BILIRUB SERPL-MCNC: 0.8 MG/DL (ref 0–1.2)
BILIRUB UR QL STRIP: NEGATIVE
BUN SERPL-MCNC: 18 MG/DL (ref 8–23)
BUN/CREAT SERPL: 15.5 (ref 7–25)
CALCIUM SPEC-SCNC: 9.5 MG/DL (ref 8.6–10.5)
CHLORIDE SERPL-SCNC: 104 MMOL/L (ref 98–107)
CLARITY UR: CLEAR
CO2 SERPL-SCNC: 25.8 MMOL/L (ref 22–29)
COLOR UR: ABNORMAL
CREAT SERPL-MCNC: 1.16 MG/DL (ref 0.76–1.27)
CREAT UR-MCNC: 128.2 MG/DL
DEPRECATED RDW RBC AUTO: 42.6 FL (ref 37–54)
EOSINOPHIL # BLD AUTO: 0.09 10*3/MM3 (ref 0–0.4)
EOSINOPHIL NFR BLD AUTO: 2.1 % (ref 0.3–6.2)
ERYTHROCYTE [DISTWIDTH] IN BLOOD BY AUTOMATED COUNT: 13 % (ref 12.3–15.4)
GFR SERPL CREATININE-BSD FRML MDRD: 64 ML/MIN/1.73
GLOBULIN UR ELPH-MCNC: 2.2 GM/DL
GLUCOSE SERPL-MCNC: 90 MG/DL (ref 65–99)
GLUCOSE UR STRIP-MCNC: NEGATIVE MG/DL
HCT VFR BLD AUTO: 42.2 % (ref 37.5–51)
HGB BLD-MCNC: 14.3 G/DL (ref 13–17.7)
HGB UR QL STRIP.AUTO: NEGATIVE
HYALINE CASTS UR QL AUTO: NORMAL /LPF
IMM GRANULOCYTES # BLD AUTO: 0.01 10*3/MM3 (ref 0–0.05)
IMM GRANULOCYTES NFR BLD AUTO: 0.2 % (ref 0–0.5)
KETONES UR QL STRIP: NEGATIVE
LEUKOCYTE ESTERASE UR QL STRIP.AUTO: ABNORMAL
LYMPHOCYTES # BLD AUTO: 1.3 10*3/MM3 (ref 0.7–3.1)
LYMPHOCYTES NFR BLD AUTO: 30 % (ref 19.6–45.3)
MAGNESIUM SERPL-MCNC: 2.1 MG/DL (ref 1.6–2.4)
MCH RBC QN AUTO: 31.3 PG (ref 26.6–33)
MCHC RBC AUTO-ENTMCNC: 33.9 G/DL (ref 31.5–35.7)
MCV RBC AUTO: 92.3 FL (ref 79–97)
MONOCYTES # BLD AUTO: 0.52 10*3/MM3 (ref 0.1–0.9)
MONOCYTES NFR BLD AUTO: 12 % (ref 5–12)
NEUTROPHILS NFR BLD AUTO: 2.38 10*3/MM3 (ref 1.7–7)
NEUTROPHILS NFR BLD AUTO: 55 % (ref 42.7–76)
NITRITE UR QL STRIP: NEGATIVE
NRBC BLD AUTO-RTO: 0 /100 WBC (ref 0–0.2)
PH UR STRIP.AUTO: 7.5 [PH] (ref 5–8)
PLATELET # BLD AUTO: 178 10*3/MM3 (ref 140–450)
PMV BLD AUTO: 11.1 FL (ref 6–12)
POTASSIUM SERPL-SCNC: 4.1 MMOL/L (ref 3.5–5.2)
PROT SERPL-MCNC: 6.9 G/DL (ref 6–8.5)
PROT UR QL STRIP: ABNORMAL
PROT UR-MCNC: 47 MG/DL
PROT/CREAT UR: 366.6 MG/G CREA (ref 0–200)
PTH-INTACT SERPL-MCNC: 27.5 PG/ML (ref 15–65)
RBC # BLD AUTO: 4.57 10*6/MM3 (ref 4.14–5.8)
RBC # UR: NORMAL /HPF
REF LAB TEST METHOD: NORMAL
SODIUM SERPL-SCNC: 142 MMOL/L (ref 136–145)
SP GR UR STRIP: 1.02 (ref 1–1.03)
SQUAMOUS #/AREA URNS HPF: NORMAL /HPF
URATE SERPL-MCNC: 4.1 MG/DL (ref 3.4–7)
UROBILINOGEN UR QL STRIP: ABNORMAL
WBC # BLD AUTO: 4.33 10*3/MM3 (ref 3.4–10.8)
WBC UR QL AUTO: NORMAL /HPF

## 2021-10-22 PROCEDURE — 84550 ASSAY OF BLOOD/URIC ACID: CPT

## 2021-10-22 PROCEDURE — 36415 COLL VENOUS BLD VENIPUNCTURE: CPT

## 2021-10-22 PROCEDURE — 82570 ASSAY OF URINE CREATININE: CPT

## 2021-10-22 PROCEDURE — 85025 COMPLETE CBC W/AUTO DIFF WBC: CPT

## 2021-10-22 PROCEDURE — 83970 ASSAY OF PARATHORMONE: CPT

## 2021-10-22 PROCEDURE — 84156 ASSAY OF PROTEIN URINE: CPT

## 2021-10-22 PROCEDURE — 82306 VITAMIN D 25 HYDROXY: CPT

## 2021-10-22 PROCEDURE — 81001 URINALYSIS AUTO W/SCOPE: CPT

## 2021-10-22 PROCEDURE — 80053 COMPREHEN METABOLIC PANEL: CPT

## 2021-10-22 PROCEDURE — 83735 ASSAY OF MAGNESIUM: CPT

## 2022-04-18 DIAGNOSIS — R11.2 NAUSEA AND VOMITING IN ADULT PATIENT: Primary | ICD-10-CM

## 2022-04-18 RX ORDER — ONDANSETRON 4 MG/1
8 TABLET, FILM COATED ORAL EVERY 12 HOURS PRN
Qty: 20 TABLET | Refills: 1 | Status: SHIPPED | OUTPATIENT
Start: 2022-04-18 | End: 2023-04-18

## 2022-08-03 ENCOUNTER — TELEPHONE (OUTPATIENT)
Dept: NEUROLOGY | Facility: CLINIC | Age: 62
End: 2022-08-03

## 2022-08-03 DIAGNOSIS — G47.33 OBSTRUCTIVE SLEEP APNEA: Primary | ICD-10-CM

## 2022-09-12 RX ORDER — CARVEDILOL 25 MG/1
25 TABLET ORAL
Qty: 180 TABLET | Refills: 3 | Status: CANCELLED | OUTPATIENT
Start: 2022-09-12

## 2022-10-22 ENCOUNTER — TRANSCRIBE ORDERS (OUTPATIENT)
Dept: ADMINISTRATIVE | Facility: HOSPITAL | Age: 62
End: 2022-10-22

## 2022-10-22 ENCOUNTER — LAB (OUTPATIENT)
Dept: LAB | Facility: HOSPITAL | Age: 62
End: 2022-10-22

## 2022-10-22 DIAGNOSIS — N18.30 STAGE 3 CHRONIC KIDNEY DISEASE, UNSPECIFIED WHETHER STAGE 3A OR 3B CKD: Primary | ICD-10-CM

## 2022-10-22 DIAGNOSIS — N18.30 STAGE 3 CHRONIC KIDNEY DISEASE, UNSPECIFIED WHETHER STAGE 3A OR 3B CKD: ICD-10-CM

## 2022-10-22 LAB
ANION GAP SERPL CALCULATED.3IONS-SCNC: 9.2 MMOL/L (ref 5–15)
BASOPHILS # BLD AUTO: 0.04 10*3/MM3 (ref 0–0.2)
BASOPHILS NFR BLD AUTO: 0.4 % (ref 0–1.5)
BILIRUB UR QL STRIP: NEGATIVE
BUN SERPL-MCNC: 18 MG/DL (ref 8–23)
BUN/CREAT SERPL: 14.8 (ref 7–25)
CALCIUM SPEC-SCNC: 9.8 MG/DL (ref 8.6–10.5)
CHLORIDE SERPL-SCNC: 105 MMOL/L (ref 98–107)
CLARITY UR: CLEAR
CO2 SERPL-SCNC: 30.8 MMOL/L (ref 22–29)
COLOR UR: YELLOW
CREAT SERPL-MCNC: 1.22 MG/DL (ref 0.76–1.27)
CREAT UR-MCNC: 44.2 MG/DL
DEPRECATED RDW RBC AUTO: 43.1 FL (ref 37–54)
EGFRCR SERPLBLD CKD-EPI 2021: 66.2 ML/MIN/1.73
EOSINOPHIL # BLD AUTO: 0.08 10*3/MM3 (ref 0–0.4)
EOSINOPHIL NFR BLD AUTO: 0.9 % (ref 0.3–6.2)
ERYTHROCYTE [DISTWIDTH] IN BLOOD BY AUTOMATED COUNT: 12.8 % (ref 12.3–15.4)
GLUCOSE SERPL-MCNC: 125 MG/DL (ref 65–99)
GLUCOSE UR STRIP-MCNC: NEGATIVE MG/DL
HCT VFR BLD AUTO: 41.6 % (ref 37.5–51)
HGB BLD-MCNC: 14.5 G/DL (ref 13–17.7)
HGB UR QL STRIP.AUTO: NEGATIVE
IMM GRANULOCYTES # BLD AUTO: 0.04 10*3/MM3 (ref 0–0.05)
IMM GRANULOCYTES NFR BLD AUTO: 0.4 % (ref 0–0.5)
KETONES UR QL STRIP: NEGATIVE
LEUKOCYTE ESTERASE UR QL STRIP.AUTO: NEGATIVE
LYMPHOCYTES # BLD AUTO: 1.26 10*3/MM3 (ref 0.7–3.1)
LYMPHOCYTES NFR BLD AUTO: 13.5 % (ref 19.6–45.3)
MCH RBC QN AUTO: 31.7 PG (ref 26.6–33)
MCHC RBC AUTO-ENTMCNC: 34.9 G/DL (ref 31.5–35.7)
MCV RBC AUTO: 91 FL (ref 79–97)
MONOCYTES # BLD AUTO: 1.17 10*3/MM3 (ref 0.1–0.9)
MONOCYTES NFR BLD AUTO: 12.5 % (ref 5–12)
NEUTROPHILS NFR BLD AUTO: 6.77 10*3/MM3 (ref 1.7–7)
NEUTROPHILS NFR BLD AUTO: 72.3 % (ref 42.7–76)
NITRITE UR QL STRIP: NEGATIVE
NRBC BLD AUTO-RTO: 0 /100 WBC (ref 0–0.2)
PH UR STRIP.AUTO: 6.5 [PH] (ref 5–8)
PLATELET # BLD AUTO: 168 10*3/MM3 (ref 140–450)
PMV BLD AUTO: 10.9 FL (ref 6–12)
POTASSIUM SERPL-SCNC: 4.4 MMOL/L (ref 3.5–5.2)
PROT ?TM UR-MCNC: 13.3 MG/DL
PROT UR QL STRIP: NEGATIVE
PROT/CREAT UR: 300.9 MG/G CREA (ref 0–200)
PTH-INTACT SERPL-MCNC: 30.8 PG/ML (ref 15–65)
RBC # BLD AUTO: 4.57 10*6/MM3 (ref 4.14–5.8)
SODIUM SERPL-SCNC: 145 MMOL/L (ref 136–145)
SP GR UR STRIP: 1.01 (ref 1–1.03)
URATE SERPL-MCNC: 4.8 MG/DL (ref 3.4–7)
UROBILINOGEN UR QL STRIP: NORMAL
WBC NRBC COR # BLD: 9.36 10*3/MM3 (ref 3.4–10.8)

## 2022-10-22 PROCEDURE — 81003 URINALYSIS AUTO W/O SCOPE: CPT

## 2022-10-22 PROCEDURE — 82570 ASSAY OF URINE CREATININE: CPT

## 2022-10-22 PROCEDURE — 84156 ASSAY OF PROTEIN URINE: CPT

## 2022-10-22 PROCEDURE — 83970 ASSAY OF PARATHORMONE: CPT

## 2022-10-22 PROCEDURE — 85025 COMPLETE CBC W/AUTO DIFF WBC: CPT

## 2022-10-22 PROCEDURE — 80048 BASIC METABOLIC PNL TOTAL CA: CPT

## 2022-10-22 PROCEDURE — 84550 ASSAY OF BLOOD/URIC ACID: CPT

## 2022-10-22 PROCEDURE — 36415 COLL VENOUS BLD VENIPUNCTURE: CPT

## 2022-11-29 RX ORDER — DULOXETIN HYDROCHLORIDE 60 MG/1
CAPSULE, DELAYED RELEASE ORAL
Qty: 90 CAPSULE | Refills: 0 | OUTPATIENT
Start: 2022-11-29

## 2022-12-02 RX ORDER — LEVOTHYROXINE SODIUM 0.05 MG/1
50 TABLET ORAL DAILY
Qty: 90 TABLET | Refills: 0 | OUTPATIENT
Start: 2022-12-02

## 2023-10-20 ENCOUNTER — LAB (OUTPATIENT)
Dept: LAB | Facility: HOSPITAL | Age: 63
End: 2023-10-20
Payer: COMMERCIAL

## 2023-10-20 ENCOUNTER — TRANSCRIBE ORDERS (OUTPATIENT)
Dept: ADMINISTRATIVE | Facility: HOSPITAL | Age: 63
End: 2023-10-20
Payer: COMMERCIAL

## 2023-10-20 DIAGNOSIS — E03.9 PRIMARY HYPOTHYROIDISM: ICD-10-CM

## 2023-10-20 DIAGNOSIS — R73.9 BLOOD GLUCOSE ELEVATED: ICD-10-CM

## 2023-10-20 DIAGNOSIS — N18.2 CHRONIC KIDNEY DISEASE, STAGE II (MILD): Primary | ICD-10-CM

## 2023-10-20 DIAGNOSIS — M15.9 GENERALIZED OSTEOARTHROSIS, INVOLVING MULTIPLE SITES: ICD-10-CM

## 2023-10-20 DIAGNOSIS — I10 ESSENTIAL HYPERTENSION, MALIGNANT: ICD-10-CM

## 2023-10-20 DIAGNOSIS — N18.2 CHRONIC KIDNEY DISEASE, STAGE II (MILD): ICD-10-CM

## 2023-10-20 DIAGNOSIS — Z00.00 ROUTINE GENERAL MEDICAL EXAMINATION AT A HEALTH CARE FACILITY: ICD-10-CM

## 2023-10-20 DIAGNOSIS — N18.31 CHRONIC KIDNEY DISEASE (CKD) STAGE G3A/A1, MODERATELY DECREASED GLOMERULAR FILTRATION RATE (GFR) BETWEEN 45-59 ML/MIN/1.73 SQUARE METER AND ALBUMINURIA CREATININE RATIO LESS THAN 30 MG/G (CMS/H*: ICD-10-CM

## 2023-10-20 DIAGNOSIS — Z00.00 ROUTINE GENERAL MEDICAL EXAMINATION AT A HEALTH CARE FACILITY: Primary | ICD-10-CM

## 2023-10-20 DIAGNOSIS — R80.9 PROTEINURIA, UNSPECIFIED TYPE: ICD-10-CM

## 2023-10-20 LAB
25(OH)D3 SERPL-MCNC: 55.1 NG/ML (ref 30–100)
ALBUMIN SERPL-MCNC: 4.9 G/DL (ref 3.5–5.2)
ALBUMIN UR-MCNC: 29.4 MG/DL
ALBUMIN/GLOB SERPL: 1.7 G/DL
ALP SERPL-CCNC: 72 U/L (ref 39–117)
ALT SERPL W P-5'-P-CCNC: 35 U/L (ref 1–41)
ANION GAP SERPL CALCULATED.3IONS-SCNC: 11 MMOL/L (ref 5–15)
AST SERPL-CCNC: 22 U/L (ref 1–40)
BACTERIA UR QL AUTO: NORMAL /HPF
BASOPHILS # BLD AUTO: 0 10*3/MM3 (ref 0–0.2)
BASOPHILS NFR BLD AUTO: 0.6 % (ref 0–1.5)
BILIRUB SERPL-MCNC: 0.5 MG/DL (ref 0–1.2)
BILIRUB UR QL STRIP: NEGATIVE
BUN SERPL-MCNC: 26 MG/DL (ref 8–23)
BUN/CREAT SERPL: 20.5 (ref 7–25)
CALCIUM SPEC-SCNC: 10.1 MG/DL (ref 8.6–10.5)
CHLORIDE SERPL-SCNC: 103 MMOL/L (ref 98–107)
CHOLEST SERPL-MCNC: 191 MG/DL (ref 0–200)
CLARITY UR: CLEAR
CO2 SERPL-SCNC: 27 MMOL/L (ref 22–29)
COLOR UR: YELLOW
CREAT SERPL-MCNC: 1.27 MG/DL (ref 0.76–1.27)
CREAT UR-MCNC: 131.7 MG/DL
CREAT UR-MCNC: 131.7 MG/DL
DEPRECATED RDW RBC AUTO: 48.6 FL (ref 37–54)
EGFRCR SERPLBLD CKD-EPI 2021: 62.7 ML/MIN/1.73
EOSINOPHIL # BLD AUTO: 0.1 10*3/MM3 (ref 0–0.4)
EOSINOPHIL NFR BLD AUTO: 1.6 % (ref 0.3–6.2)
ERYTHROCYTE [DISTWIDTH] IN BLOOD BY AUTOMATED COUNT: 14.7 % (ref 12.3–15.4)
GLOBULIN UR ELPH-MCNC: 2.9 GM/DL
GLUCOSE SERPL-MCNC: 111 MG/DL (ref 65–99)
GLUCOSE UR STRIP-MCNC: NEGATIVE MG/DL
HBA1C MFR BLD: 5.7 % (ref 4.8–5.6)
HCT VFR BLD AUTO: 47.8 % (ref 37.5–51)
HDLC SERPL-MCNC: 34 MG/DL (ref 40–60)
HGB BLD-MCNC: 16.7 G/DL (ref 13–17.7)
HGB UR QL STRIP.AUTO: NEGATIVE
HYALINE CASTS UR QL AUTO: NORMAL /LPF
KETONES UR QL STRIP: NEGATIVE
LDLC SERPL CALC-MCNC: 126 MG/DL (ref 0–100)
LDLC/HDLC SERPL: 3.6 {RATIO}
LEUKOCYTE ESTERASE UR QL STRIP.AUTO: NEGATIVE
LYMPHOCYTES # BLD AUTO: 1.3 10*3/MM3 (ref 0.7–3.1)
LYMPHOCYTES NFR BLD AUTO: 22.5 % (ref 19.6–45.3)
MCH RBC QN AUTO: 34 PG (ref 26.6–33)
MCHC RBC AUTO-ENTMCNC: 34.9 G/DL (ref 31.5–35.7)
MCV RBC AUTO: 97.3 FL (ref 79–97)
MICROALBUMIN/CREAT UR: 223.2 MG/G (ref 0–29)
MONOCYTES # BLD AUTO: 0.7 10*3/MM3 (ref 0.1–0.9)
MONOCYTES NFR BLD AUTO: 11.1 % (ref 5–12)
NEUTROPHILS NFR BLD AUTO: 3.8 10*3/MM3 (ref 1.7–7)
NEUTROPHILS NFR BLD AUTO: 64.2 % (ref 42.7–76)
NITRITE UR QL STRIP: NEGATIVE
NRBC BLD AUTO-RTO: 0.1 /100 WBC (ref 0–0.2)
PH UR STRIP.AUTO: 5.5 [PH] (ref 5–8)
PLATELET # BLD AUTO: 186 10*3/MM3 (ref 140–450)
PMV BLD AUTO: 8.7 FL (ref 6–12)
POTASSIUM SERPL-SCNC: 4 MMOL/L (ref 3.5–5.2)
PROT ?TM UR-MCNC: 47.8 MG/DL
PROT SERPL-MCNC: 7.8 G/DL (ref 6–8.5)
PROT UR QL STRIP: ABNORMAL
PROT/CREAT UR: 362.9 MG/G CREA (ref 0–200)
PSA SERPL-MCNC: 2.6 NG/ML (ref 0–4)
PTH-INTACT SERPL-MCNC: 27.2 PG/ML (ref 15–65)
RBC # BLD AUTO: 4.91 10*6/MM3 (ref 4.14–5.8)
RBC # UR STRIP: NORMAL /HPF
REF LAB TEST METHOD: NORMAL
SODIUM SERPL-SCNC: 141 MMOL/L (ref 136–145)
SP GR UR STRIP: 1.02 (ref 1–1.03)
SQUAMOUS #/AREA URNS HPF: NORMAL /HPF
TRIGL SERPL-MCNC: 173 MG/DL (ref 0–150)
TSH SERPL DL<=0.05 MIU/L-ACNC: 2.11 UIU/ML (ref 0.27–4.2)
URATE SERPL-MCNC: 5.9 MG/DL (ref 3.4–7)
UROBILINOGEN UR QL STRIP: ABNORMAL
VLDLC SERPL-MCNC: 31 MG/DL (ref 5–40)
WBC # UR STRIP: NORMAL /HPF
WBC NRBC COR # BLD: 6 10*3/MM3 (ref 3.4–10.8)

## 2023-10-20 PROCEDURE — 80061 LIPID PANEL: CPT

## 2023-10-20 PROCEDURE — 80050 GENERAL HEALTH PANEL: CPT

## 2023-10-20 PROCEDURE — 83970 ASSAY OF PARATHORMONE: CPT

## 2023-10-20 PROCEDURE — 83036 HEMOGLOBIN GLYCOSYLATED A1C: CPT

## 2023-10-20 PROCEDURE — 82043 UR ALBUMIN QUANTITATIVE: CPT

## 2023-10-20 PROCEDURE — 84153 ASSAY OF PSA TOTAL: CPT

## 2023-10-20 PROCEDURE — 81001 URINALYSIS AUTO W/SCOPE: CPT

## 2023-10-20 PROCEDURE — 82570 ASSAY OF URINE CREATININE: CPT

## 2023-10-20 PROCEDURE — 36415 COLL VENOUS BLD VENIPUNCTURE: CPT

## 2023-10-20 PROCEDURE — 84156 ASSAY OF PROTEIN URINE: CPT

## 2023-10-20 PROCEDURE — 84550 ASSAY OF BLOOD/URIC ACID: CPT

## 2023-10-20 PROCEDURE — 82306 VITAMIN D 25 HYDROXY: CPT

## 2023-11-13 RX ORDER — DULOXETIN HYDROCHLORIDE 60 MG/1
60 CAPSULE, DELAYED RELEASE ORAL DAILY
Qty: 90 CAPSULE | Refills: 3 | Status: CANCELLED | OUTPATIENT
Start: 2023-11-13

## 2023-11-13 RX ORDER — CARVEDILOL 25 MG/1
TABLET ORAL
Qty: 180 TABLET | Refills: 3 | Status: CANCELLED | OUTPATIENT
Start: 2023-11-13

## 2023-11-13 RX ORDER — FINASTERIDE 5 MG/1
5 TABLET, FILM COATED ORAL DAILY
Qty: 90 TABLET | Refills: 3 | Status: CANCELLED | OUTPATIENT
Start: 2023-11-13

## 2023-11-13 RX ORDER — ATORVASTATIN CALCIUM 20 MG/1
20 TABLET, FILM COATED ORAL DAILY
Qty: 90 TABLET | Refills: 3 | Status: CANCELLED | OUTPATIENT
Start: 2023-11-13

## 2023-11-13 RX ORDER — ALLOPURINOL 300 MG/1
300 TABLET ORAL DAILY
Qty: 90 TABLET | Refills: 3 | Status: CANCELLED | OUTPATIENT
Start: 2023-11-13

## 2023-11-16 RX ORDER — ALLOPURINOL 300 MG/1
300 TABLET ORAL DAILY
Qty: 90 TABLET | Refills: 3 | Status: CANCELLED | OUTPATIENT
Start: 2023-11-13

## 2023-11-16 RX ORDER — FINASTERIDE 5 MG/1
5 TABLET, FILM COATED ORAL DAILY
Qty: 90 TABLET | Refills: 3 | Status: CANCELLED | OUTPATIENT
Start: 2023-11-13

## 2023-11-16 RX ORDER — ATORVASTATIN CALCIUM 20 MG/1
20 TABLET, FILM COATED ORAL DAILY
Qty: 90 TABLET | Refills: 3 | Status: CANCELLED | OUTPATIENT
Start: 2023-11-13

## 2023-11-16 RX ORDER — CARVEDILOL 25 MG/1
TABLET ORAL
Qty: 180 TABLET | Refills: 3 | Status: CANCELLED | OUTPATIENT
Start: 2023-11-13

## 2023-11-16 RX ORDER — DULOXETIN HYDROCHLORIDE 60 MG/1
60 CAPSULE, DELAYED RELEASE ORAL DAILY
Qty: 90 CAPSULE | Refills: 3 | Status: CANCELLED | OUTPATIENT
Start: 2023-11-13

## 2023-11-20 RX ORDER — FINASTERIDE 5 MG/1
5 TABLET, FILM COATED ORAL DAILY
Qty: 90 TABLET | Refills: 3 | Status: CANCELLED | OUTPATIENT
Start: 2023-11-13

## 2023-11-20 RX ORDER — CARVEDILOL 25 MG/1
TABLET ORAL
Qty: 180 TABLET | Refills: 3 | Status: CANCELLED | OUTPATIENT
Start: 2023-11-13

## 2023-11-20 RX ORDER — DULOXETIN HYDROCHLORIDE 60 MG/1
60 CAPSULE, DELAYED RELEASE ORAL DAILY
Qty: 90 CAPSULE | Refills: 3 | Status: CANCELLED | OUTPATIENT
Start: 2023-11-13

## 2023-11-22 RX ORDER — CARVEDILOL 25 MG/1
TABLET ORAL
Qty: 180 TABLET | Refills: 3 | Status: CANCELLED | OUTPATIENT
Start: 2023-11-13

## 2023-11-22 RX ORDER — FINASTERIDE 5 MG/1
5 TABLET, FILM COATED ORAL DAILY
Qty: 90 TABLET | Refills: 3 | Status: CANCELLED | OUTPATIENT
Start: 2023-11-13

## 2023-11-22 RX ORDER — DULOXETIN HYDROCHLORIDE 60 MG/1
60 CAPSULE, DELAYED RELEASE ORAL DAILY
Qty: 90 CAPSULE | Refills: 3 | Status: CANCELLED | OUTPATIENT
Start: 2023-11-13

## 2023-11-24 RX ORDER — DULOXETIN HYDROCHLORIDE 60 MG/1
60 CAPSULE, DELAYED RELEASE ORAL DAILY
Qty: 90 CAPSULE | Refills: 3 | Status: CANCELLED | OUTPATIENT
Start: 2023-11-13

## 2023-11-24 RX ORDER — CARVEDILOL 25 MG/1
TABLET ORAL
Qty: 180 TABLET | Refills: 3 | Status: CANCELLED | OUTPATIENT
Start: 2023-11-13

## 2023-11-24 RX ORDER — FINASTERIDE 5 MG/1
5 TABLET, FILM COATED ORAL DAILY
Qty: 90 TABLET | Refills: 3 | Status: CANCELLED | OUTPATIENT
Start: 2023-11-13

## 2023-11-27 RX ORDER — DULOXETIN HYDROCHLORIDE 60 MG/1
60 CAPSULE, DELAYED RELEASE ORAL DAILY
Qty: 90 CAPSULE | Refills: 3 | Status: CANCELLED | OUTPATIENT
Start: 2023-11-13

## 2023-11-27 RX ORDER — FINASTERIDE 5 MG/1
5 TABLET, FILM COATED ORAL DAILY
Qty: 90 TABLET | Refills: 3 | Status: CANCELLED | OUTPATIENT
Start: 2023-11-13

## 2023-11-27 RX ORDER — CARVEDILOL 25 MG/1
TABLET ORAL
Qty: 180 TABLET | Refills: 3 | Status: CANCELLED | OUTPATIENT
Start: 2023-11-13

## 2023-11-29 RX ORDER — CARVEDILOL 25 MG/1
TABLET ORAL
Qty: 180 TABLET | Refills: 3 | Status: CANCELLED | OUTPATIENT
Start: 2023-11-13

## 2023-11-29 RX ORDER — DULOXETIN HYDROCHLORIDE 60 MG/1
60 CAPSULE, DELAYED RELEASE ORAL DAILY
Qty: 90 CAPSULE | Refills: 3 | Status: CANCELLED | OUTPATIENT
Start: 2023-11-13

## 2023-11-29 RX ORDER — FINASTERIDE 5 MG/1
5 TABLET, FILM COATED ORAL DAILY
Qty: 90 TABLET | Refills: 3 | Status: CANCELLED | OUTPATIENT
Start: 2023-11-13

## 2023-12-01 RX ORDER — FINASTERIDE 5 MG/1
5 TABLET, FILM COATED ORAL DAILY
Qty: 90 TABLET | Refills: 3 | Status: CANCELLED | OUTPATIENT
Start: 2023-11-13

## 2023-12-01 RX ORDER — CARVEDILOL 25 MG/1
TABLET ORAL
Qty: 180 TABLET | Refills: 3 | Status: CANCELLED | OUTPATIENT
Start: 2023-11-13

## 2023-12-01 RX ORDER — DULOXETIN HYDROCHLORIDE 60 MG/1
60 CAPSULE, DELAYED RELEASE ORAL DAILY
Qty: 90 CAPSULE | Refills: 3 | Status: CANCELLED | OUTPATIENT
Start: 2023-11-13

## 2023-12-04 RX ORDER — CARVEDILOL 25 MG/1
TABLET ORAL
Qty: 180 TABLET | Refills: 3 | Status: CANCELLED | OUTPATIENT
Start: 2023-11-13

## 2023-12-04 RX ORDER — FINASTERIDE 5 MG/1
5 TABLET, FILM COATED ORAL DAILY
Qty: 90 TABLET | Refills: 3 | Status: CANCELLED | OUTPATIENT
Start: 2023-11-13

## 2023-12-04 RX ORDER — DULOXETIN HYDROCHLORIDE 60 MG/1
60 CAPSULE, DELAYED RELEASE ORAL DAILY
Qty: 90 CAPSULE | Refills: 3 | Status: CANCELLED | OUTPATIENT
Start: 2023-11-13

## 2023-12-07 RX ORDER — CARVEDILOL 25 MG/1
TABLET ORAL
Qty: 180 TABLET | Refills: 3 | Status: CANCELLED | OUTPATIENT
Start: 2023-11-13

## 2023-12-07 RX ORDER — DULOXETIN HYDROCHLORIDE 60 MG/1
60 CAPSULE, DELAYED RELEASE ORAL DAILY
Qty: 90 CAPSULE | Refills: 3 | Status: CANCELLED | OUTPATIENT
Start: 2023-11-13

## 2023-12-07 RX ORDER — FINASTERIDE 5 MG/1
5 TABLET, FILM COATED ORAL DAILY
Qty: 90 TABLET | Refills: 3 | Status: CANCELLED | OUTPATIENT
Start: 2023-11-13

## 2023-12-11 RX ORDER — CARVEDILOL 25 MG/1
TABLET ORAL
Qty: 180 TABLET | Refills: 3 | Status: CANCELLED | OUTPATIENT
Start: 2023-11-13

## 2023-12-11 RX ORDER — DULOXETIN HYDROCHLORIDE 60 MG/1
60 CAPSULE, DELAYED RELEASE ORAL DAILY
Qty: 90 CAPSULE | Refills: 3 | Status: CANCELLED | OUTPATIENT
Start: 2023-11-13

## 2023-12-11 RX ORDER — FINASTERIDE 5 MG/1
5 TABLET, FILM COATED ORAL DAILY
Qty: 90 TABLET | Refills: 3 | Status: CANCELLED | OUTPATIENT
Start: 2023-11-13

## 2023-12-13 RX ORDER — CARVEDILOL 25 MG/1
TABLET ORAL
Qty: 180 TABLET | Refills: 3 | Status: CANCELLED | OUTPATIENT
Start: 2023-11-13

## 2023-12-13 RX ORDER — DULOXETIN HYDROCHLORIDE 60 MG/1
60 CAPSULE, DELAYED RELEASE ORAL DAILY
Qty: 90 CAPSULE | Refills: 3 | Status: CANCELLED | OUTPATIENT
Start: 2023-11-13

## 2023-12-13 RX ORDER — FINASTERIDE 5 MG/1
5 TABLET, FILM COATED ORAL DAILY
Qty: 90 TABLET | Refills: 3 | Status: CANCELLED | OUTPATIENT
Start: 2023-11-13

## 2023-12-19 RX ORDER — LOSARTAN POTASSIUM 100 MG/1
TABLET ORAL
Qty: 90 TABLET | Refills: 3 | Status: CANCELLED | OUTPATIENT
Start: 2023-12-19

## 2023-12-20 RX ORDER — LOSARTAN POTASSIUM 100 MG/1
TABLET ORAL
Qty: 90 TABLET | Refills: 3 | Status: CANCELLED | OUTPATIENT
Start: 2023-12-19

## 2023-12-20 RX ORDER — DULOXETIN HYDROCHLORIDE 60 MG/1
60 CAPSULE, DELAYED RELEASE ORAL DAILY
Qty: 90 CAPSULE | Refills: 3 | Status: CANCELLED | OUTPATIENT
Start: 2023-12-20

## 2023-12-20 RX ORDER — FINASTERIDE 5 MG/1
5 TABLET, FILM COATED ORAL DAILY
Qty: 90 TABLET | Refills: 3 | Status: CANCELLED | OUTPATIENT
Start: 2023-12-20

## 2023-12-20 RX ORDER — CARVEDILOL 25 MG/1
TABLET ORAL
Qty: 180 TABLET | Refills: 3 | Status: CANCELLED | OUTPATIENT
Start: 2023-12-20

## 2023-12-22 RX ORDER — LOSARTAN POTASSIUM 100 MG/1
TABLET ORAL
Qty: 90 TABLET | Refills: 3 | Status: CANCELLED | OUTPATIENT
Start: 2023-12-19

## 2023-12-22 RX ORDER — DULOXETIN HYDROCHLORIDE 60 MG/1
60 CAPSULE, DELAYED RELEASE ORAL DAILY
Qty: 90 CAPSULE | Refills: 3 | Status: CANCELLED | OUTPATIENT
Start: 2023-12-20

## 2023-12-22 RX ORDER — CARVEDILOL 25 MG/1
TABLET ORAL
Qty: 180 TABLET | Refills: 3 | Status: CANCELLED | OUTPATIENT
Start: 2023-12-20

## 2023-12-22 RX ORDER — FINASTERIDE 5 MG/1
5 TABLET, FILM COATED ORAL DAILY
Qty: 90 TABLET | Refills: 3 | Status: CANCELLED | OUTPATIENT
Start: 2023-12-20

## 2023-12-26 RX ORDER — DULOXETIN HYDROCHLORIDE 60 MG/1
60 CAPSULE, DELAYED RELEASE ORAL DAILY
Qty: 90 CAPSULE | Refills: 3 | Status: CANCELLED | OUTPATIENT
Start: 2023-12-20

## 2023-12-26 RX ORDER — LOSARTAN POTASSIUM 100 MG/1
TABLET ORAL
Qty: 90 TABLET | Refills: 3 | Status: CANCELLED | OUTPATIENT
Start: 2023-12-19

## 2023-12-26 RX ORDER — CARVEDILOL 25 MG/1
TABLET ORAL
Qty: 180 TABLET | Refills: 3 | Status: CANCELLED | OUTPATIENT
Start: 2023-12-20

## 2023-12-26 RX ORDER — FINASTERIDE 5 MG/1
5 TABLET, FILM COATED ORAL DAILY
Qty: 90 TABLET | Refills: 3 | Status: CANCELLED | OUTPATIENT
Start: 2023-12-20

## 2023-12-28 RX ORDER — LOSARTAN POTASSIUM 100 MG/1
TABLET ORAL
Qty: 90 TABLET | Refills: 3 | Status: CANCELLED | OUTPATIENT
Start: 2023-12-19

## 2023-12-28 RX ORDER — DULOXETIN HYDROCHLORIDE 60 MG/1
60 CAPSULE, DELAYED RELEASE ORAL DAILY
Qty: 90 CAPSULE | Refills: 3 | Status: CANCELLED | OUTPATIENT
Start: 2023-12-20

## 2023-12-28 RX ORDER — CARVEDILOL 25 MG/1
TABLET ORAL
Qty: 180 TABLET | Refills: 3 | Status: CANCELLED | OUTPATIENT
Start: 2023-12-20

## 2023-12-28 RX ORDER — FINASTERIDE 5 MG/1
5 TABLET, FILM COATED ORAL DAILY
Qty: 90 TABLET | Refills: 3 | Status: CANCELLED | OUTPATIENT
Start: 2023-12-20

## 2024-01-01 RX ORDER — DULOXETIN HYDROCHLORIDE 60 MG/1
60 CAPSULE, DELAYED RELEASE ORAL DAILY
Qty: 90 CAPSULE | Refills: 3 | Status: CANCELLED | OUTPATIENT
Start: 2023-12-20

## 2024-01-01 RX ORDER — LOSARTAN POTASSIUM 100 MG/1
TABLET ORAL
Qty: 90 TABLET | Refills: 3 | Status: CANCELLED | OUTPATIENT
Start: 2023-12-19

## 2024-01-01 RX ORDER — FINASTERIDE 5 MG/1
5 TABLET, FILM COATED ORAL DAILY
Qty: 90 TABLET | Refills: 3 | Status: CANCELLED | OUTPATIENT
Start: 2023-12-20

## 2024-01-01 RX ORDER — CARVEDILOL 25 MG/1
TABLET ORAL
Qty: 180 TABLET | Refills: 3 | Status: CANCELLED | OUTPATIENT
Start: 2023-12-20

## 2024-01-03 RX ORDER — DULOXETIN HYDROCHLORIDE 60 MG/1
60 CAPSULE, DELAYED RELEASE ORAL DAILY
Qty: 90 CAPSULE | Refills: 3 | Status: CANCELLED | OUTPATIENT
Start: 2023-12-20

## 2024-01-03 RX ORDER — LOSARTAN POTASSIUM 100 MG/1
TABLET ORAL
Qty: 90 TABLET | Refills: 3 | Status: CANCELLED | OUTPATIENT
Start: 2023-12-19

## 2024-01-03 RX ORDER — CARVEDILOL 25 MG/1
TABLET ORAL
Qty: 180 TABLET | Refills: 3 | Status: CANCELLED | OUTPATIENT
Start: 2023-12-20

## 2024-01-03 RX ORDER — FINASTERIDE 5 MG/1
5 TABLET, FILM COATED ORAL DAILY
Qty: 90 TABLET | Refills: 3 | Status: CANCELLED | OUTPATIENT
Start: 2023-12-20

## 2024-01-08 RX ORDER — LOSARTAN POTASSIUM 100 MG/1
TABLET ORAL
Qty: 90 TABLET | Refills: 3 | Status: CANCELLED | OUTPATIENT
Start: 2023-12-19

## 2024-01-08 RX ORDER — FINASTERIDE 5 MG/1
5 TABLET, FILM COATED ORAL DAILY
Qty: 90 TABLET | Refills: 3 | Status: CANCELLED | OUTPATIENT
Start: 2023-12-20

## 2024-01-08 RX ORDER — DULOXETIN HYDROCHLORIDE 60 MG/1
60 CAPSULE, DELAYED RELEASE ORAL DAILY
Qty: 90 CAPSULE | Refills: 3 | Status: CANCELLED | OUTPATIENT
Start: 2023-12-20

## 2024-01-08 RX ORDER — CARVEDILOL 25 MG/1
TABLET ORAL
Qty: 180 TABLET | Refills: 3 | Status: CANCELLED | OUTPATIENT
Start: 2023-12-20

## 2024-01-10 RX ORDER — LOSARTAN POTASSIUM 100 MG/1
TABLET ORAL
Qty: 90 TABLET | Refills: 3 | Status: CANCELLED | OUTPATIENT
Start: 2023-12-19

## 2024-01-10 RX ORDER — DULOXETIN HYDROCHLORIDE 60 MG/1
60 CAPSULE, DELAYED RELEASE ORAL DAILY
Qty: 90 CAPSULE | Refills: 3 | Status: CANCELLED | OUTPATIENT
Start: 2023-12-20

## 2024-01-10 RX ORDER — FINASTERIDE 5 MG/1
5 TABLET, FILM COATED ORAL DAILY
Qty: 90 TABLET | Refills: 3 | Status: CANCELLED | OUTPATIENT
Start: 2023-12-20

## 2024-01-10 RX ORDER — CARVEDILOL 25 MG/1
TABLET ORAL
Qty: 180 TABLET | Refills: 3 | Status: CANCELLED | OUTPATIENT
Start: 2023-12-20

## 2024-01-15 RX ORDER — CARVEDILOL 25 MG/1
TABLET ORAL
Qty: 180 TABLET | Refills: 3 | Status: CANCELLED | OUTPATIENT
Start: 2023-12-20

## 2024-01-15 RX ORDER — DULOXETIN HYDROCHLORIDE 60 MG/1
60 CAPSULE, DELAYED RELEASE ORAL DAILY
Qty: 90 CAPSULE | Refills: 3 | Status: CANCELLED | OUTPATIENT
Start: 2023-12-20

## 2024-01-15 RX ORDER — LOSARTAN POTASSIUM 100 MG/1
TABLET ORAL
Qty: 90 TABLET | Refills: 3 | Status: CANCELLED | OUTPATIENT
Start: 2023-12-19

## 2024-01-15 RX ORDER — FINASTERIDE 5 MG/1
5 TABLET, FILM COATED ORAL DAILY
Qty: 90 TABLET | Refills: 3 | Status: CANCELLED | OUTPATIENT
Start: 2023-12-20

## 2024-01-17 RX ORDER — FINASTERIDE 5 MG/1
5 TABLET, FILM COATED ORAL DAILY
Qty: 90 TABLET | Refills: 3 | Status: CANCELLED | OUTPATIENT
Start: 2023-12-20

## 2024-01-17 RX ORDER — LOSARTAN POTASSIUM 100 MG/1
TABLET ORAL
Qty: 90 TABLET | Refills: 3 | Status: CANCELLED | OUTPATIENT
Start: 2023-12-19

## 2024-01-17 RX ORDER — DULOXETIN HYDROCHLORIDE 60 MG/1
60 CAPSULE, DELAYED RELEASE ORAL DAILY
Qty: 90 CAPSULE | Refills: 3 | Status: CANCELLED | OUTPATIENT
Start: 2023-12-20

## 2024-01-17 RX ORDER — CARVEDILOL 25 MG/1
TABLET ORAL
Qty: 180 TABLET | Refills: 3 | Status: CANCELLED | OUTPATIENT
Start: 2023-12-20

## 2024-01-19 RX ORDER — CARVEDILOL 25 MG/1
TABLET ORAL
Qty: 180 TABLET | Refills: 3 | Status: CANCELLED | OUTPATIENT
Start: 2023-12-20

## 2024-01-19 RX ORDER — FINASTERIDE 5 MG/1
5 TABLET, FILM COATED ORAL DAILY
Qty: 90 TABLET | Refills: 3 | Status: CANCELLED | OUTPATIENT
Start: 2023-12-20

## 2024-01-19 RX ORDER — LOSARTAN POTASSIUM 100 MG/1
TABLET ORAL
Qty: 90 TABLET | Refills: 3 | Status: CANCELLED | OUTPATIENT
Start: 2023-12-19

## 2024-01-19 RX ORDER — DULOXETIN HYDROCHLORIDE 60 MG/1
60 CAPSULE, DELAYED RELEASE ORAL DAILY
Qty: 90 CAPSULE | Refills: 3 | Status: CANCELLED | OUTPATIENT
Start: 2023-12-20

## 2024-01-22 RX ORDER — LOSARTAN POTASSIUM 100 MG/1
TABLET ORAL
Qty: 90 TABLET | Refills: 3 | Status: CANCELLED | OUTPATIENT
Start: 2023-12-19

## 2024-01-22 RX ORDER — FINASTERIDE 5 MG/1
5 TABLET, FILM COATED ORAL DAILY
Qty: 90 TABLET | Refills: 3 | Status: CANCELLED | OUTPATIENT
Start: 2023-12-20

## 2024-01-22 RX ORDER — CARVEDILOL 25 MG/1
TABLET ORAL
Qty: 180 TABLET | Refills: 3 | Status: CANCELLED | OUTPATIENT
Start: 2023-12-20

## 2024-01-22 RX ORDER — DULOXETIN HYDROCHLORIDE 60 MG/1
60 CAPSULE, DELAYED RELEASE ORAL DAILY
Qty: 90 CAPSULE | Refills: 3 | Status: CANCELLED | OUTPATIENT
Start: 2023-12-20

## 2024-01-24 RX ORDER — FINASTERIDE 5 MG/1
5 TABLET, FILM COATED ORAL DAILY
Qty: 90 TABLET | Refills: 3 | Status: CANCELLED | OUTPATIENT
Start: 2023-12-20

## 2024-01-24 RX ORDER — LOSARTAN POTASSIUM 100 MG/1
TABLET ORAL
Qty: 90 TABLET | Refills: 3 | Status: CANCELLED | OUTPATIENT
Start: 2023-12-19

## 2024-01-24 RX ORDER — CARVEDILOL 25 MG/1
TABLET ORAL
Qty: 180 TABLET | Refills: 3 | Status: CANCELLED | OUTPATIENT
Start: 2023-12-20

## 2024-01-24 RX ORDER — DULOXETIN HYDROCHLORIDE 60 MG/1
60 CAPSULE, DELAYED RELEASE ORAL DAILY
Qty: 90 CAPSULE | Refills: 3 | Status: CANCELLED | OUTPATIENT
Start: 2023-12-20

## 2024-01-26 RX ORDER — DULOXETIN HYDROCHLORIDE 60 MG/1
60 CAPSULE, DELAYED RELEASE ORAL DAILY
Qty: 90 CAPSULE | Refills: 3 | Status: CANCELLED | OUTPATIENT
Start: 2023-12-20

## 2024-01-26 RX ORDER — LOSARTAN POTASSIUM 100 MG/1
TABLET ORAL
Qty: 90 TABLET | Refills: 3 | Status: CANCELLED | OUTPATIENT
Start: 2023-12-19

## 2024-01-26 RX ORDER — FINASTERIDE 5 MG/1
5 TABLET, FILM COATED ORAL DAILY
Qty: 90 TABLET | Refills: 3 | Status: CANCELLED | OUTPATIENT
Start: 2023-12-20

## 2024-01-26 RX ORDER — CARVEDILOL 25 MG/1
TABLET ORAL
Qty: 180 TABLET | Refills: 3 | Status: CANCELLED | OUTPATIENT
Start: 2023-12-20

## 2024-01-29 RX ORDER — DULOXETIN HYDROCHLORIDE 60 MG/1
60 CAPSULE, DELAYED RELEASE ORAL DAILY
Qty: 90 CAPSULE | Refills: 3 | Status: CANCELLED | OUTPATIENT
Start: 2023-12-20

## 2024-01-29 RX ORDER — FINASTERIDE 5 MG/1
5 TABLET, FILM COATED ORAL DAILY
Qty: 90 TABLET | Refills: 3 | Status: CANCELLED | OUTPATIENT
Start: 2023-12-20

## 2024-01-29 RX ORDER — LOSARTAN POTASSIUM 100 MG/1
TABLET ORAL
Qty: 90 TABLET | Refills: 3 | Status: CANCELLED | OUTPATIENT
Start: 2023-12-19

## 2024-01-29 RX ORDER — CARVEDILOL 25 MG/1
TABLET ORAL
Qty: 180 TABLET | Refills: 3 | Status: CANCELLED | OUTPATIENT
Start: 2023-12-20

## 2024-01-31 RX ORDER — DULOXETIN HYDROCHLORIDE 60 MG/1
60 CAPSULE, DELAYED RELEASE ORAL DAILY
Qty: 90 CAPSULE | Refills: 3 | Status: CANCELLED | OUTPATIENT
Start: 2023-12-20

## 2024-01-31 RX ORDER — FINASTERIDE 5 MG/1
5 TABLET, FILM COATED ORAL DAILY
Qty: 90 TABLET | Refills: 3 | Status: CANCELLED | OUTPATIENT
Start: 2023-12-20

## 2024-01-31 RX ORDER — CARVEDILOL 25 MG/1
TABLET ORAL
Qty: 180 TABLET | Refills: 3 | Status: CANCELLED | OUTPATIENT
Start: 2023-12-20

## 2024-01-31 RX ORDER — LOSARTAN POTASSIUM 100 MG/1
TABLET ORAL
Qty: 90 TABLET | Refills: 3 | Status: CANCELLED | OUTPATIENT
Start: 2023-12-19

## 2024-02-02 RX ORDER — LOSARTAN POTASSIUM 100 MG/1
TABLET ORAL
Qty: 90 TABLET | Refills: 3 | Status: CANCELLED | OUTPATIENT
Start: 2023-12-19

## 2024-02-02 RX ORDER — DULOXETIN HYDROCHLORIDE 60 MG/1
60 CAPSULE, DELAYED RELEASE ORAL DAILY
Qty: 90 CAPSULE | Refills: 3 | Status: CANCELLED | OUTPATIENT
Start: 2023-12-20

## 2024-02-02 RX ORDER — CARVEDILOL 25 MG/1
TABLET ORAL
Qty: 180 TABLET | Refills: 3 | Status: CANCELLED | OUTPATIENT
Start: 2023-12-20

## 2024-02-02 RX ORDER — FINASTERIDE 5 MG/1
5 TABLET, FILM COATED ORAL DAILY
Qty: 90 TABLET | Refills: 3 | Status: CANCELLED | OUTPATIENT
Start: 2023-12-20

## 2024-02-05 RX ORDER — LOSARTAN POTASSIUM 100 MG/1
TABLET ORAL
Qty: 90 TABLET | Refills: 3 | Status: CANCELLED | OUTPATIENT
Start: 2023-12-19

## 2024-02-05 RX ORDER — FINASTERIDE 5 MG/1
5 TABLET, FILM COATED ORAL DAILY
Qty: 90 TABLET | Refills: 3 | Status: CANCELLED | OUTPATIENT
Start: 2023-12-20

## 2024-02-05 RX ORDER — CARVEDILOL 25 MG/1
TABLET ORAL
Qty: 180 TABLET | Refills: 3 | Status: CANCELLED | OUTPATIENT
Start: 2023-12-20

## 2024-02-05 RX ORDER — DULOXETIN HYDROCHLORIDE 60 MG/1
60 CAPSULE, DELAYED RELEASE ORAL DAILY
Qty: 90 CAPSULE | Refills: 3 | Status: CANCELLED | OUTPATIENT
Start: 2023-12-20

## 2024-02-07 RX ORDER — DULOXETIN HYDROCHLORIDE 60 MG/1
60 CAPSULE, DELAYED RELEASE ORAL DAILY
Qty: 90 CAPSULE | Refills: 3 | Status: CANCELLED | OUTPATIENT
Start: 2023-12-20

## 2024-02-07 RX ORDER — FINASTERIDE 5 MG/1
5 TABLET, FILM COATED ORAL DAILY
Qty: 90 TABLET | Refills: 3 | Status: CANCELLED | OUTPATIENT
Start: 2023-12-20

## 2024-02-07 RX ORDER — LOSARTAN POTASSIUM 100 MG/1
TABLET ORAL
Qty: 90 TABLET | Refills: 3 | Status: CANCELLED | OUTPATIENT
Start: 2023-12-19

## 2024-02-07 RX ORDER — CARVEDILOL 25 MG/1
TABLET ORAL
Qty: 180 TABLET | Refills: 3 | Status: CANCELLED | OUTPATIENT
Start: 2023-12-20

## 2024-02-09 RX ORDER — LOSARTAN POTASSIUM 100 MG/1
TABLET ORAL
Qty: 90 TABLET | Refills: 3 | Status: CANCELLED | OUTPATIENT
Start: 2023-12-19

## 2024-02-09 RX ORDER — FINASTERIDE 5 MG/1
5 TABLET, FILM COATED ORAL DAILY
Qty: 90 TABLET | Refills: 3 | Status: CANCELLED | OUTPATIENT
Start: 2023-12-20

## 2024-02-09 RX ORDER — DULOXETIN HYDROCHLORIDE 60 MG/1
60 CAPSULE, DELAYED RELEASE ORAL DAILY
Qty: 90 CAPSULE | Refills: 3 | Status: CANCELLED | OUTPATIENT
Start: 2023-12-20

## 2024-02-09 RX ORDER — CARVEDILOL 25 MG/1
TABLET ORAL
Qty: 180 TABLET | Refills: 3 | Status: CANCELLED | OUTPATIENT
Start: 2023-12-20

## 2024-02-12 RX ORDER — LOSARTAN POTASSIUM 100 MG/1
TABLET ORAL
Qty: 90 TABLET | Refills: 3 | Status: CANCELLED | OUTPATIENT
Start: 2023-12-19

## 2024-02-12 RX ORDER — DULOXETIN HYDROCHLORIDE 60 MG/1
60 CAPSULE, DELAYED RELEASE ORAL DAILY
Qty: 90 CAPSULE | Refills: 3 | Status: CANCELLED | OUTPATIENT
Start: 2023-12-20

## 2024-02-12 RX ORDER — CARVEDILOL 25 MG/1
TABLET ORAL
Qty: 180 TABLET | Refills: 3 | Status: CANCELLED | OUTPATIENT
Start: 2023-12-20

## 2024-02-12 RX ORDER — FINASTERIDE 5 MG/1
5 TABLET, FILM COATED ORAL DAILY
Qty: 90 TABLET | Refills: 3 | Status: CANCELLED | OUTPATIENT
Start: 2023-12-20

## 2024-02-14 RX ORDER — CARVEDILOL 25 MG/1
TABLET ORAL
Qty: 180 TABLET | Refills: 3 | Status: CANCELLED | OUTPATIENT
Start: 2023-12-20

## 2024-02-14 RX ORDER — DULOXETIN HYDROCHLORIDE 60 MG/1
60 CAPSULE, DELAYED RELEASE ORAL DAILY
Qty: 90 CAPSULE | Refills: 3 | Status: CANCELLED | OUTPATIENT
Start: 2023-12-20

## 2024-02-14 RX ORDER — FINASTERIDE 5 MG/1
5 TABLET, FILM COATED ORAL DAILY
Qty: 90 TABLET | Refills: 3 | Status: CANCELLED | OUTPATIENT
Start: 2023-12-20

## 2024-02-14 RX ORDER — LOSARTAN POTASSIUM 100 MG/1
TABLET ORAL
Qty: 90 TABLET | Refills: 3 | Status: CANCELLED | OUTPATIENT
Start: 2023-12-19

## 2024-02-19 RX ORDER — LOSARTAN POTASSIUM 100 MG/1
TABLET ORAL
Qty: 90 TABLET | Refills: 3 | Status: CANCELLED | OUTPATIENT
Start: 2023-12-19

## 2024-02-21 RX ORDER — LOSARTAN POTASSIUM 100 MG/1
TABLET ORAL
Qty: 90 TABLET | Refills: 3 | Status: CANCELLED | OUTPATIENT
Start: 2023-12-19

## 2024-02-22 RX ORDER — LOSARTAN POTASSIUM 100 MG/1
TABLET ORAL
Qty: 90 TABLET | Refills: 3 | Status: CANCELLED | OUTPATIENT
Start: 2023-12-19

## 2024-02-26 RX ORDER — LOSARTAN POTASSIUM 100 MG/1
TABLET ORAL
Qty: 90 TABLET | Refills: 3 | Status: CANCELLED | OUTPATIENT
Start: 2023-12-19

## 2024-02-28 RX ORDER — LOSARTAN POTASSIUM 100 MG/1
TABLET ORAL
Qty: 90 TABLET | Refills: 3 | Status: CANCELLED | OUTPATIENT
Start: 2023-12-19

## 2024-03-01 RX ORDER — LOSARTAN POTASSIUM 100 MG/1
TABLET ORAL
Qty: 90 TABLET | Refills: 3 | Status: CANCELLED | OUTPATIENT
Start: 2023-12-19

## 2024-03-04 RX ORDER — LOSARTAN POTASSIUM 100 MG/1
TABLET ORAL
Qty: 90 TABLET | Refills: 3 | Status: CANCELLED | OUTPATIENT
Start: 2023-12-19

## 2024-03-06 RX ORDER — LOSARTAN POTASSIUM 100 MG/1
TABLET ORAL
Qty: 90 TABLET | Refills: 3 | Status: CANCELLED | OUTPATIENT
Start: 2023-12-19

## 2024-03-08 RX ORDER — LOSARTAN POTASSIUM 100 MG/1
TABLET ORAL
Qty: 90 TABLET | Refills: 3 | Status: CANCELLED | OUTPATIENT
Start: 2023-12-19

## 2024-03-11 RX ORDER — LOSARTAN POTASSIUM 100 MG/1
TABLET ORAL
Qty: 90 TABLET | Refills: 3 | Status: CANCELLED | OUTPATIENT
Start: 2023-12-19

## 2024-03-13 RX ORDER — LOSARTAN POTASSIUM 100 MG/1
TABLET ORAL
Qty: 90 TABLET | Refills: 3 | Status: CANCELLED | OUTPATIENT
Start: 2023-12-19

## 2024-03-18 RX ORDER — LOSARTAN POTASSIUM 100 MG/1
TABLET ORAL
Qty: 90 TABLET | Refills: 3 | Status: CANCELLED | OUTPATIENT
Start: 2023-12-19

## 2024-03-20 RX ORDER — LOSARTAN POTASSIUM 100 MG/1
TABLET ORAL
Qty: 90 TABLET | Refills: 3 | Status: CANCELLED | OUTPATIENT
Start: 2023-12-19

## 2024-03-22 RX ORDER — LOSARTAN POTASSIUM 100 MG/1
TABLET ORAL
Qty: 90 TABLET | Refills: 3 | Status: CANCELLED | OUTPATIENT
Start: 2023-12-19

## 2024-03-25 RX ORDER — LOSARTAN POTASSIUM 100 MG/1
TABLET ORAL
Qty: 90 TABLET | Refills: 3 | Status: CANCELLED | OUTPATIENT
Start: 2023-12-19

## 2024-03-27 RX ORDER — LOSARTAN POTASSIUM 100 MG/1
TABLET ORAL
Qty: 90 TABLET | Refills: 3 | Status: CANCELLED | OUTPATIENT
Start: 2023-12-19

## 2024-04-01 RX ORDER — LOSARTAN POTASSIUM 100 MG/1
TABLET ORAL
Qty: 90 TABLET | Refills: 3 | Status: CANCELLED | OUTPATIENT
Start: 2023-12-19

## 2024-04-03 RX ORDER — LOSARTAN POTASSIUM 100 MG/1
TABLET ORAL
Qty: 90 TABLET | Refills: 3 | Status: CANCELLED | OUTPATIENT
Start: 2023-12-19

## 2024-04-08 RX ORDER — LOSARTAN POTASSIUM 100 MG/1
TABLET ORAL
Qty: 90 TABLET | Refills: 3 | Status: CANCELLED | OUTPATIENT
Start: 2023-12-19

## 2024-04-10 RX ORDER — LOSARTAN POTASSIUM 100 MG/1
TABLET ORAL
Qty: 90 TABLET | Refills: 3 | Status: CANCELLED | OUTPATIENT
Start: 2023-12-19

## 2024-04-12 RX ORDER — LOSARTAN POTASSIUM 100 MG/1
TABLET ORAL
Qty: 90 TABLET | Refills: 3 | Status: CANCELLED | OUTPATIENT
Start: 2023-12-19

## 2024-04-15 RX ORDER — LOSARTAN POTASSIUM 100 MG/1
TABLET ORAL
Qty: 90 TABLET | Refills: 3 | Status: CANCELLED | OUTPATIENT
Start: 2023-12-19

## 2024-04-17 RX ORDER — LOSARTAN POTASSIUM 100 MG/1
TABLET ORAL
Qty: 90 TABLET | Refills: 3 | Status: CANCELLED | OUTPATIENT
Start: 2023-12-19

## 2024-04-19 RX ORDER — LOSARTAN POTASSIUM 100 MG/1
TABLET ORAL
Qty: 90 TABLET | Refills: 3 | Status: CANCELLED | OUTPATIENT
Start: 2023-12-19

## 2024-04-22 RX ORDER — LOSARTAN POTASSIUM 100 MG/1
TABLET ORAL
Qty: 90 TABLET | Refills: 3 | Status: CANCELLED | OUTPATIENT
Start: 2023-12-19

## 2024-04-24 RX ORDER — LOSARTAN POTASSIUM 100 MG/1
TABLET ORAL
Qty: 90 TABLET | Refills: 3 | Status: CANCELLED | OUTPATIENT
Start: 2023-12-19

## 2024-04-26 RX ORDER — LOSARTAN POTASSIUM 100 MG/1
TABLET ORAL
Qty: 90 TABLET | Refills: 3 | Status: CANCELLED | OUTPATIENT
Start: 2023-12-19

## 2024-04-29 RX ORDER — LOSARTAN POTASSIUM 100 MG/1
TABLET ORAL
Qty: 90 TABLET | Refills: 3 | Status: CANCELLED | OUTPATIENT
Start: 2023-12-19

## 2024-05-01 RX ORDER — LOSARTAN POTASSIUM 100 MG/1
TABLET ORAL
Qty: 90 TABLET | Refills: 3 | Status: CANCELLED | OUTPATIENT
Start: 2023-12-19

## 2024-05-06 RX ORDER — LOSARTAN POTASSIUM 100 MG/1
TABLET ORAL
Qty: 90 TABLET | Refills: 3 | Status: CANCELLED | OUTPATIENT
Start: 2023-12-19

## 2024-08-13 RX ORDER — DULOXETIN HYDROCHLORIDE 60 MG/1
60 CAPSULE, DELAYED RELEASE ORAL DAILY
Qty: 90 CAPSULE | Refills: 0 | Status: CANCELLED | OUTPATIENT
Start: 2024-08-13

## 2024-08-15 RX ORDER — DULOXETIN HYDROCHLORIDE 60 MG/1
60 CAPSULE, DELAYED RELEASE ORAL DAILY
Qty: 90 CAPSULE | Refills: 0 | Status: CANCELLED | OUTPATIENT
Start: 2024-08-13

## 2024-08-19 RX ORDER — DULOXETIN HYDROCHLORIDE 60 MG/1
60 CAPSULE, DELAYED RELEASE ORAL DAILY
Qty: 90 CAPSULE | Refills: 0 | Status: CANCELLED | OUTPATIENT
Start: 2024-08-13

## 2024-09-20 RX ORDER — LOSARTAN POTASSIUM 100 MG/1
100 TABLET ORAL DAILY
Qty: 90 TABLET | Refills: 3 | Status: CANCELLED | OUTPATIENT
Start: 2024-09-20

## 2024-09-23 ENCOUNTER — PATIENT ROUNDING (BHMG ONLY) (OUTPATIENT)
Dept: URGENT CARE | Facility: CLINIC | Age: 64
End: 2024-09-23
Payer: COMMERCIAL

## 2024-09-25 RX ORDER — LOSARTAN POTASSIUM 100 MG/1
100 TABLET ORAL DAILY
Qty: 90 TABLET | Refills: 3 | Status: CANCELLED | OUTPATIENT
Start: 2024-09-20

## 2024-11-06 ENCOUNTER — PHARMACY IMMUNIZATION REVIEW (OUTPATIENT)
Dept: PHARMACY | Facility: HOSPITAL | Age: 64
End: 2024-11-06
Payer: COMMERCIAL

## 2024-11-06 RX ORDER — LEVOTHYROXINE SODIUM 50 UG/1
TABLET ORAL DAILY
Qty: 90 TABLET | Refills: 3 | Status: CANCELLED | OUTPATIENT
Start: 2024-11-04

## 2024-11-06 RX ORDER — ALLOPURINOL 300 MG/1
300 TABLET ORAL DAILY
Qty: 90 TABLET | Refills: 3 | Status: CANCELLED | OUTPATIENT
Start: 2024-11-04

## 2024-11-06 RX ORDER — ATORVASTATIN CALCIUM 20 MG/1
20 TABLET, FILM COATED ORAL DAILY
Qty: 90 TABLET | Refills: 3 | Status: CANCELLED | OUTPATIENT
Start: 2024-11-04

## 2024-11-06 NOTE — PROGRESS NOTES
[facility] Vaccination Administration    Patient reported to [facility] via referral on 11/6/2024    Allergies:    Clindamycin hcl    Vaccine History:   Immunization History   Administered Date(s) Administered    Arexvy (RSV, Adults 60+ yrs) 11/06/2024    Pneumococcal Conjugate 20-Valent (PCV20) 11/06/2024       Plan:    The following vaccines were administered today:  PCV20 and RSV    Laquita Lester  11/6/2024  13:22 EST    
Uncooperative

## 2024-11-07 RX ORDER — LEVOTHYROXINE SODIUM 50 UG/1
TABLET ORAL DAILY
Qty: 90 TABLET | Refills: 3 | Status: CANCELLED | OUTPATIENT
Start: 2024-11-07

## 2024-11-07 RX ORDER — ALLOPURINOL 300 MG/1
300 TABLET ORAL DAILY
Qty: 90 TABLET | Refills: 3 | Status: CANCELLED | OUTPATIENT
Start: 2024-11-07

## 2024-11-07 RX ORDER — ATORVASTATIN CALCIUM 20 MG/1
20 TABLET, FILM COATED ORAL DAILY
Qty: 90 TABLET | Refills: 3 | Status: CANCELLED | OUTPATIENT
Start: 2024-11-07

## 2024-11-08 ENCOUNTER — TRANSCRIBE ORDERS (OUTPATIENT)
Dept: LAB | Facility: HOSPITAL | Age: 64
End: 2024-11-08
Payer: COMMERCIAL

## 2024-11-08 ENCOUNTER — LAB (OUTPATIENT)
Dept: LAB | Facility: HOSPITAL | Age: 64
End: 2024-11-08
Payer: COMMERCIAL

## 2024-11-08 DIAGNOSIS — R80.9 PROTEINURIA, UNSPECIFIED TYPE: ICD-10-CM

## 2024-11-08 DIAGNOSIS — E03.9 PRIMARY HYPOTHYROIDISM: ICD-10-CM

## 2024-11-08 DIAGNOSIS — Z12.5 SCREENING FOR PROSTATE CANCER: ICD-10-CM

## 2024-11-08 DIAGNOSIS — M15.9 GENERALIZED OSTEOARTHRITIS: ICD-10-CM

## 2024-11-08 DIAGNOSIS — I10 ESSENTIAL HYPERTENSION, BENIGN: Primary | ICD-10-CM

## 2024-11-08 DIAGNOSIS — E78.2 MIXED HYPERLIPIDEMIA: ICD-10-CM

## 2024-11-08 DIAGNOSIS — I10 ESSENTIAL HYPERTENSION, BENIGN: ICD-10-CM

## 2024-11-08 DIAGNOSIS — N18.2 CHRONIC KIDNEY DISEASE, STAGE II (MILD): ICD-10-CM

## 2024-11-08 DIAGNOSIS — N18.2 CHRONIC KIDNEY DISEASE, STAGE II (MILD): Primary | ICD-10-CM

## 2024-11-08 LAB
ALBUMIN SERPL-MCNC: 4.4 G/DL (ref 3.5–5.2)
ALBUMIN/GLOB SERPL: 1.9 G/DL
ALP SERPL-CCNC: 74 U/L (ref 39–117)
ALT SERPL W P-5'-P-CCNC: 28 U/L (ref 1–41)
ANION GAP SERPL CALCULATED.3IONS-SCNC: 11 MMOL/L (ref 5–15)
AST SERPL-CCNC: 17 U/L (ref 1–40)
BACTERIA UR QL AUTO: NORMAL /HPF
BASOPHILS # BLD AUTO: 0.05 10*3/MM3 (ref 0–0.2)
BASOPHILS NFR BLD AUTO: 0.8 % (ref 0–1.5)
BILIRUB SERPL-MCNC: 0.8 MG/DL (ref 0–1.2)
BILIRUB UR QL STRIP: NEGATIVE
BUN SERPL-MCNC: 21 MG/DL (ref 8–23)
BUN/CREAT SERPL: 15.2 (ref 7–25)
CALCIUM SPEC-SCNC: 9.2 MG/DL (ref 8.6–10.5)
CHLORIDE SERPL-SCNC: 103 MMOL/L (ref 98–107)
CHOLEST SERPL-MCNC: 157 MG/DL (ref 0–200)
CLARITY UR: CLEAR
CO2 SERPL-SCNC: 27 MMOL/L (ref 22–29)
COLOR UR: YELLOW
CREAT SERPL-MCNC: 1.38 MG/DL (ref 0.76–1.27)
CREAT UR-MCNC: 128.2 MG/DL
DEPRECATED RDW RBC AUTO: 43.5 FL (ref 37–54)
EGFRCR SERPLBLD CKD-EPI 2021: 56.4 ML/MIN/1.73
EOSINOPHIL # BLD AUTO: 0.18 10*3/MM3 (ref 0–0.4)
EOSINOPHIL NFR BLD AUTO: 2.7 % (ref 0.3–6.2)
ERYTHROCYTE [DISTWIDTH] IN BLOOD BY AUTOMATED COUNT: 12.8 % (ref 12.3–15.4)
GLOBULIN UR ELPH-MCNC: 2.3 GM/DL
GLUCOSE SERPL-MCNC: 102 MG/DL (ref 65–99)
GLUCOSE UR STRIP-MCNC: NEGATIVE MG/DL
HCT VFR BLD AUTO: 45.2 % (ref 37.5–51)
HDLC SERPL-MCNC: 31 MG/DL (ref 40–60)
HGB BLD-MCNC: 15.2 G/DL (ref 13–17.7)
HGB UR QL STRIP.AUTO: NEGATIVE
HOLD SPECIMEN: NORMAL
HYALINE CASTS UR QL AUTO: NORMAL /LPF
IMM GRANULOCYTES # BLD AUTO: 0.02 10*3/MM3 (ref 0–0.05)
IMM GRANULOCYTES NFR BLD AUTO: 0.3 % (ref 0–0.5)
KETONES UR QL STRIP: NEGATIVE
LDLC SERPL CALC-MCNC: 103 MG/DL (ref 0–100)
LDLC/HDLC SERPL: 3.26 {RATIO}
LEUKOCYTE ESTERASE UR QL STRIP.AUTO: ABNORMAL
LYMPHOCYTES # BLD AUTO: 1.13 10*3/MM3 (ref 0.7–3.1)
LYMPHOCYTES NFR BLD AUTO: 17.1 % (ref 19.6–45.3)
MCH RBC QN AUTO: 31.5 PG (ref 26.6–33)
MCHC RBC AUTO-ENTMCNC: 33.6 G/DL (ref 31.5–35.7)
MCV RBC AUTO: 93.6 FL (ref 79–97)
MONOCYTES # BLD AUTO: 1.16 10*3/MM3 (ref 0.1–0.9)
MONOCYTES NFR BLD AUTO: 17.5 % (ref 5–12)
NEUTROPHILS NFR BLD AUTO: 4.08 10*3/MM3 (ref 1.7–7)
NEUTROPHILS NFR BLD AUTO: 61.6 % (ref 42.7–76)
NITRITE UR QL STRIP: NEGATIVE
NRBC BLD AUTO-RTO: 0 /100 WBC (ref 0–0.2)
PH UR STRIP.AUTO: 6.5 [PH] (ref 5–8)
PLATELET # BLD AUTO: 175 10*3/MM3 (ref 140–450)
PMV BLD AUTO: 10.8 FL (ref 6–12)
POTASSIUM SERPL-SCNC: 4.4 MMOL/L (ref 3.5–5.2)
PROT ?TM UR-MCNC: 30.9 MG/DL
PROT SERPL-MCNC: 6.7 G/DL (ref 6–8.5)
PROT UR QL STRIP: ABNORMAL
PROT/CREAT UR: 241 MG/G CREA (ref 0–200)
PSA SERPL-MCNC: 0.6 NG/ML (ref 0–4)
RBC # BLD AUTO: 4.83 10*6/MM3 (ref 4.14–5.8)
RBC # UR STRIP: NORMAL /HPF
REF LAB TEST METHOD: NORMAL
SODIUM SERPL-SCNC: 141 MMOL/L (ref 136–145)
SP GR UR STRIP: 1.02 (ref 1–1.03)
SQUAMOUS #/AREA URNS HPF: NORMAL /HPF
T4 FREE SERPL-MCNC: 1.01 NG/DL (ref 0.92–1.68)
TRIGL SERPL-MCNC: 124 MG/DL (ref 0–150)
TSH SERPL DL<=0.05 MIU/L-ACNC: 1.81 UIU/ML (ref 0.27–4.2)
URATE SERPL-MCNC: 4.8 MG/DL (ref 3.4–7)
UROBILINOGEN UR QL STRIP: ABNORMAL
VLDLC SERPL-MCNC: 23 MG/DL (ref 5–40)
WBC # UR STRIP: NORMAL /HPF
WBC NRBC COR # BLD AUTO: 6.62 10*3/MM3 (ref 3.4–10.8)

## 2024-11-08 PROCEDURE — 81001 URINALYSIS AUTO W/SCOPE: CPT

## 2024-11-08 PROCEDURE — 82570 ASSAY OF URINE CREATININE: CPT

## 2024-11-08 PROCEDURE — 84156 ASSAY OF PROTEIN URINE: CPT

## 2024-11-08 PROCEDURE — 84439 ASSAY OF FREE THYROXINE: CPT

## 2024-11-08 PROCEDURE — 80061 LIPID PANEL: CPT

## 2024-11-08 PROCEDURE — 80050 GENERAL HEALTH PANEL: CPT

## 2024-11-08 PROCEDURE — 36415 COLL VENOUS BLD VENIPUNCTURE: CPT

## 2024-11-08 PROCEDURE — G0103 PSA SCREENING: HCPCS

## 2024-11-08 PROCEDURE — 84550 ASSAY OF BLOOD/URIC ACID: CPT

## 2024-11-08 RX ORDER — ALLOPURINOL 300 MG/1
300 TABLET ORAL DAILY
Qty: 90 TABLET | Refills: 3 | Status: CANCELLED | OUTPATIENT
Start: 2024-11-08

## 2024-11-08 RX ORDER — LEVOTHYROXINE SODIUM 50 UG/1
TABLET ORAL DAILY
Qty: 90 TABLET | Refills: 3 | Status: CANCELLED | OUTPATIENT
Start: 2024-11-08

## 2024-11-08 RX ORDER — ATORVASTATIN CALCIUM 20 MG/1
20 TABLET, FILM COATED ORAL DAILY
Qty: 90 TABLET | Refills: 3 | Status: CANCELLED | OUTPATIENT
Start: 2024-11-08

## 2024-11-11 RX ORDER — LEVOTHYROXINE SODIUM 50 UG/1
TABLET ORAL DAILY
Qty: 90 TABLET | Refills: 3 | Status: CANCELLED | OUTPATIENT
Start: 2024-11-11

## 2024-11-15 RX ORDER — LEVOTHYROXINE SODIUM 50 UG/1
TABLET ORAL DAILY
Qty: 90 TABLET | Refills: 3 | Status: CANCELLED | OUTPATIENT
Start: 2024-11-13

## 2024-12-11 RX ORDER — LEVOTHYROXINE SODIUM 50 UG/1
TABLET ORAL DAILY
Qty: 90 TABLET | Refills: 3 | Status: CANCELLED | OUTPATIENT
Start: 2024-12-03

## 2024-12-13 RX ORDER — LEVOTHYROXINE SODIUM 50 UG/1
TABLET ORAL DAILY
Qty: 90 TABLET | Refills: 3 | Status: CANCELLED | OUTPATIENT
Start: 2024-12-03

## 2025-04-20 ENCOUNTER — APPOINTMENT (OUTPATIENT)
Dept: GENERAL RADIOLOGY | Facility: HOSPITAL | Age: 65
DRG: 322 | End: 2025-04-20
Payer: COMMERCIAL

## 2025-04-20 ENCOUNTER — HOSPITAL ENCOUNTER (INPATIENT)
Facility: HOSPITAL | Age: 65
LOS: 1 days | Discharge: HOME | DRG: 322 | End: 2025-04-22
Attending: EMERGENCY MEDICINE | Admitting: INTERNAL MEDICINE
Payer: COMMERCIAL

## 2025-04-20 DIAGNOSIS — I21.9 ACUTE MYOCARDIAL INFARCTION, UNSPECIFIED MI TYPE, UNSPECIFIED ARTERY: Primary | ICD-10-CM

## 2025-04-20 LAB
ALBUMIN SERPL-MCNC: 4.4 G/DL (ref 3.5–5.2)
ALBUMIN/GLOB SERPL: 2.1 G/DL
ALP SERPL-CCNC: 69 U/L (ref 39–117)
ALT SERPL W P-5'-P-CCNC: 37 U/L (ref 1–41)
ANION GAP SERPL CALCULATED.3IONS-SCNC: 10.5 MMOL/L (ref 5–15)
AST SERPL-CCNC: 51 U/L (ref 1–40)
BASOPHILS # BLD AUTO: 0.04 10*3/MM3 (ref 0–0.2)
BASOPHILS NFR BLD AUTO: 0.5 % (ref 0–1.5)
BILIRUB SERPL-MCNC: 0.4 MG/DL (ref 0–1.2)
BUN SERPL-MCNC: 24 MG/DL (ref 8–23)
BUN/CREAT SERPL: 16.3 (ref 7–25)
CALCIUM SPEC-SCNC: 9.4 MG/DL (ref 8.6–10.5)
CHLORIDE SERPL-SCNC: 105 MMOL/L (ref 98–107)
CO2 SERPL-SCNC: 24.5 MMOL/L (ref 22–29)
CREAT SERPL-MCNC: 1.47 MG/DL (ref 0.76–1.27)
DEPRECATED RDW RBC AUTO: 47.1 FL (ref 37–54)
EGFRCR SERPLBLD CKD-EPI 2021: 52 ML/MIN/1.73
EOSINOPHIL # BLD AUTO: 0.09 10*3/MM3 (ref 0–0.4)
EOSINOPHIL NFR BLD AUTO: 1 % (ref 0.3–6.2)
ERYTHROCYTE [DISTWIDTH] IN BLOOD BY AUTOMATED COUNT: 13.9 % (ref 12.3–15.4)
GLOBULIN UR ELPH-MCNC: 2.1 GM/DL
GLUCOSE SERPL-MCNC: 137 MG/DL (ref 65–99)
HCT VFR BLD AUTO: 40.5 % (ref 37.5–51)
HGB BLD-MCNC: 13.4 G/DL (ref 13–17.7)
IMM GRANULOCYTES # BLD AUTO: 0.02 10*3/MM3 (ref 0–0.05)
IMM GRANULOCYTES NFR BLD AUTO: 0.2 % (ref 0–0.5)
LYMPHOCYTES # BLD AUTO: 1.64 10*3/MM3 (ref 0.7–3.1)
LYMPHOCYTES NFR BLD AUTO: 19 % (ref 19.6–45.3)
MCH RBC QN AUTO: 30.8 PG (ref 26.6–33)
MCHC RBC AUTO-ENTMCNC: 33.1 G/DL (ref 31.5–35.7)
MCV RBC AUTO: 93.1 FL (ref 79–97)
MONOCYTES # BLD AUTO: 1.04 10*3/MM3 (ref 0.1–0.9)
MONOCYTES NFR BLD AUTO: 12 % (ref 5–12)
NEUTROPHILS NFR BLD AUTO: 5.82 10*3/MM3 (ref 1.7–7)
NEUTROPHILS NFR BLD AUTO: 67.3 % (ref 42.7–76)
NRBC BLD AUTO-RTO: 0 /100 WBC (ref 0–0.2)
PLATELET # BLD AUTO: 164 10*3/MM3 (ref 140–450)
PMV BLD AUTO: 10.6 FL (ref 6–12)
POTASSIUM SERPL-SCNC: 4 MMOL/L (ref 3.5–5.2)
PROT SERPL-MCNC: 6.5 G/DL (ref 6–8.5)
RBC # BLD AUTO: 4.35 10*6/MM3 (ref 4.14–5.8)
SODIUM SERPL-SCNC: 140 MMOL/L (ref 136–145)
TROPONIN T SERPL HS-MCNC: 267 NG/L
WBC NRBC COR # BLD AUTO: 8.65 10*3/MM3 (ref 3.4–10.8)

## 2025-04-20 PROCEDURE — 93005 ELECTROCARDIOGRAM TRACING: CPT | Performed by: EMERGENCY MEDICINE

## 2025-04-20 PROCEDURE — 36415 COLL VENOUS BLD VENIPUNCTURE: CPT

## 2025-04-20 PROCEDURE — 71045 X-RAY EXAM CHEST 1 VIEW: CPT

## 2025-04-20 PROCEDURE — 25010000002 HEPARIN (PORCINE) 25000-0.45 UT/250ML-% SOLUTION: Performed by: EMERGENCY MEDICINE

## 2025-04-20 PROCEDURE — 80053 COMPREHEN METABOLIC PANEL: CPT | Performed by: EMERGENCY MEDICINE

## 2025-04-20 PROCEDURE — 84484 ASSAY OF TROPONIN QUANT: CPT | Performed by: EMERGENCY MEDICINE

## 2025-04-20 PROCEDURE — 85025 COMPLETE CBC W/AUTO DIFF WBC: CPT | Performed by: EMERGENCY MEDICINE

## 2025-04-20 PROCEDURE — 99285 EMERGENCY DEPT VISIT HI MDM: CPT

## 2025-04-20 PROCEDURE — 93005 ELECTROCARDIOGRAM TRACING: CPT

## 2025-04-20 RX ORDER — NITROGLYCERIN 0.4 MG/1
0.4 TABLET SUBLINGUAL
Status: DISCONTINUED | OUTPATIENT
Start: 2025-04-20 | End: 2025-04-21 | Stop reason: SDUPTHER

## 2025-04-20 RX ORDER — SODIUM CHLORIDE 0.9 % (FLUSH) 0.9 %
10 SYRINGE (ML) INJECTION AS NEEDED
Status: DISCONTINUED | OUTPATIENT
Start: 2025-04-20 | End: 2025-04-22 | Stop reason: HOSPADM

## 2025-04-20 RX ORDER — HEPARIN SODIUM 10000 [USP'U]/100ML
11.2 INJECTION, SOLUTION INTRAVENOUS
Status: DISCONTINUED | OUTPATIENT
Start: 2025-04-20 | End: 2025-04-21

## 2025-04-20 RX ORDER — ASPIRIN 81 MG/1
324 TABLET, CHEWABLE ORAL ONCE
Status: COMPLETED | OUTPATIENT
Start: 2025-04-20 | End: 2025-04-20

## 2025-04-20 RX ORDER — NITROGLYCERIN 20 MG/100ML
5-200 INJECTION INTRAVENOUS
Status: DISCONTINUED | OUTPATIENT
Start: 2025-04-20 | End: 2025-04-22 | Stop reason: HOSPADM

## 2025-04-20 RX ADMIN — HEPARIN SODIUM 11.2 UNITS/KG/HR: 10000 INJECTION, SOLUTION INTRAVENOUS at 23:47

## 2025-04-20 RX ADMIN — NITROGLYCERIN 0.4 MG: 0.4 TABLET SUBLINGUAL at 22:57

## 2025-04-20 RX ADMIN — ASPIRIN 81 MG CHEWABLE TABLET 324 MG: 81 TABLET CHEWABLE at 22:56

## 2025-04-20 RX ADMIN — NITROGLYCERIN 0.4 MG: 0.4 TABLET SUBLINGUAL at 23:02

## 2025-04-20 NOTE — Clinical Note
Second inflation of balloon - Max pressure = 10 antonio. 2nd Inflation of balloon - Duration = 10 seconds.

## 2025-04-20 NOTE — Clinical Note
Currently they are recommending the same vaccine as the booster - that he has to discuss with vaccine site The right coronary artery was selectively engaged, injected and visualized.

## 2025-04-20 NOTE — Clinical Note
First balloon inflation max pressure = 12 antonio. First balloon inflation duration = 15 seconds. Second inflation of balloon - Max pressure = 18 antonio. 2nd Inflation of balloon - Duration = 10 seconds.

## 2025-04-20 NOTE — Clinical Note
Second inflation of balloon - Max pressure = 12 antonio. 2nd Inflation of balloon - Duration = 15 seconds.

## 2025-04-21 ENCOUNTER — TRANSCRIBE ORDERS (OUTPATIENT)
Dept: CARDIAC REHAB | Facility: HOSPITAL | Age: 65
End: 2025-04-21
Payer: COMMERCIAL

## 2025-04-21 ENCOUNTER — APPOINTMENT (OUTPATIENT)
Dept: CARDIOLOGY | Facility: HOSPITAL | Age: 65
DRG: 322 | End: 2025-04-21
Payer: COMMERCIAL

## 2025-04-21 DIAGNOSIS — Z95.5 S/P DRUG ELUTING CORONARY STENT PLACEMENT: Primary | ICD-10-CM

## 2025-04-21 PROBLEM — I21.4 NSTEMI (NON-ST ELEVATED MYOCARDIAL INFARCTION): Status: ACTIVE | Noted: 2025-04-21

## 2025-04-21 PROBLEM — I21.9 ACUTE MYOCARDIAL INFARCTION: Status: ACTIVE | Noted: 2025-04-21

## 2025-04-21 LAB
ACT BLD: 135 SECONDS (ref 89–137)
ACT BLD: 147 SECONDS (ref 89–137)
ACT BLD: 153 SECONDS (ref 89–137)
ACT BLD: 187 SECONDS (ref 89–137)
ACT BLD: 199 SECONDS (ref 89–137)
ACT BLD: 268 SECONDS (ref 89–137)
ANION GAP SERPL CALCULATED.3IONS-SCNC: 9.8 MMOL/L (ref 5–15)
AORTIC DIMENSIONLESS INDEX: 0.79 (DI)
APTT PPP: 184.9 SECONDS (ref 22.7–35.4)
AV MEAN PRESS GRAD SYS DOP V1V2: 4 MMHG
AV VMAX SYS DOP: 131 CM/SEC
BH CV ECHO LEFT VENTRICLE GLOBAL LONGITUDINAL STRAIN: -18 %
BH CV ECHO MEAS - ACS: 2.5 CM
BH CV ECHO MEAS - AO MAX PG: 6.9 MMHG
BH CV ECHO MEAS - AO V2 VTI: 29.2 CM
BH CV ECHO MEAS - AVA(I,D): 2.5 CM2
BH CV ECHO MEAS - EDV(CUBED): 79.5 ML
BH CV ECHO MEAS - EDV(MOD-SP4): 93 ML
BH CV ECHO MEAS - EF(MOD-SP4): 50.2 %
BH CV ECHO MEAS - ESV(CUBED): 42.9 ML
BH CV ECHO MEAS - ESV(MOD-SP4): 46.3 ML
BH CV ECHO MEAS - FS: 18.6 %
BH CV ECHO MEAS - IVS/LVPW: 1 CM
BH CV ECHO MEAS - IVSD: 1 CM
BH CV ECHO MEAS - LA DIMENSION: 4.9 CM
BH CV ECHO MEAS - LAT PEAK E' VEL: 10.1 CM/SEC
BH CV ECHO MEAS - LV MASS(C)D: 142.5 GRAMS
BH CV ECHO MEAS - LV MAX PG: 4.4 MMHG
BH CV ECHO MEAS - LV MEAN PG: 2 MMHG
BH CV ECHO MEAS - LV V1 MAX: 105 CM/SEC
BH CV ECHO MEAS - LV V1 VTI: 23.2 CM
BH CV ECHO MEAS - LVIDD: 4.3 CM
BH CV ECHO MEAS - LVIDS: 3.5 CM
BH CV ECHO MEAS - LVOT AREA: 3.1 CM2
BH CV ECHO MEAS - LVOT DIAM: 2 CM
BH CV ECHO MEAS - LVPWD: 1 CM
BH CV ECHO MEAS - MED PEAK E' VEL: 9 CM/SEC
BH CV ECHO MEAS - MR MAX PG: 66.9 MMHG
BH CV ECHO MEAS - MR MAX VEL: 409 CM/SEC
BH CV ECHO MEAS - MV A MAX VEL: 89.1 CM/SEC
BH CV ECHO MEAS - MV DEC SLOPE: 273.7 CM/SEC2
BH CV ECHO MEAS - MV DEC TIME: 0.34 SEC
BH CV ECHO MEAS - MV E MAX VEL: 65.5 CM/SEC
BH CV ECHO MEAS - MV E/A: 0.74
BH CV ECHO MEAS - MV MAX PG: 2.8 MMHG
BH CV ECHO MEAS - MV MEAN PG: 1 MMHG
BH CV ECHO MEAS - MV P1/2T: 88.6 MSEC
BH CV ECHO MEAS - MV V2 VTI: 29.4 CM
BH CV ECHO MEAS - MVA(P1/2T): 2.48 CM2
BH CV ECHO MEAS - MVA(VTI): 2.48 CM2
BH CV ECHO MEAS - PA V2 MAX: 89.7 CM/SEC
BH CV ECHO MEAS - PULM A REVS DUR: 0.09 SEC
BH CV ECHO MEAS - PULM A REVS VEL: 36.3 CM/SEC
BH CV ECHO MEAS - PULM DIAS VEL: 39.9 CM/SEC
BH CV ECHO MEAS - PULM S/D: 1.18
BH CV ECHO MEAS - PULM SYS VEL: 47 CM/SEC
BH CV ECHO MEAS - QP/QS: 0.57
BH CV ECHO MEAS - RV MAX PG: 1.51 MMHG
BH CV ECHO MEAS - RV V1 MAX: 61.5 CM/SEC
BH CV ECHO MEAS - RV V1 VTI: 16.4 CM
BH CV ECHO MEAS - RVDD: 3.2 CM
BH CV ECHO MEAS - RVOT DIAM: 1.8 CM
BH CV ECHO MEAS - SV(LVOT): 72.9 ML
BH CV ECHO MEAS - SV(MOD-SP4): 46.7 ML
BH CV ECHO MEAS - SV(RVOT): 41.7 ML
BH CV ECHO MEAS - TAPSE (>1.6): 2.5 CM
BH CV ECHO MEASUREMENTS AVERAGE E/E' RATIO: 6.86
BH CV XLRA - TDI S': 10.7 CM/SEC
BUN SERPL-MCNC: 19 MG/DL (ref 8–23)
BUN/CREAT SERPL: 15.3 (ref 7–25)
CALCIUM SPEC-SCNC: 8.9 MG/DL (ref 8.6–10.5)
CHLORIDE SERPL-SCNC: 108 MMOL/L (ref 98–107)
CHOLEST SERPL-MCNC: 144 MG/DL (ref 0–200)
CO2 SERPL-SCNC: 21.2 MMOL/L (ref 22–29)
CREAT SERPL-MCNC: 1.24 MG/DL (ref 0.76–1.27)
DEPRECATED RDW RBC AUTO: 46.8 FL (ref 37–54)
EGFRCR SERPLBLD CKD-EPI 2021: 63.7 ML/MIN/1.73
ERYTHROCYTE [DISTWIDTH] IN BLOOD BY AUTOMATED COUNT: 13.7 % (ref 12.3–15.4)
GEN 5 1HR TROPONIN T REFLEX: 303 NG/L
GLUCOSE SERPL-MCNC: 130 MG/DL (ref 65–99)
HCT VFR BLD AUTO: 36.9 % (ref 37.5–51)
HDLC SERPL-MCNC: 36 MG/DL (ref 40–60)
HGB BLD-MCNC: 12.4 G/DL (ref 13–17.7)
INR PPP: 1.21 (ref 0.9–1.1)
LDLC SERPL CALC-MCNC: 100 MG/DL (ref 0–100)
LDLC/HDLC SERPL: 2.82 {RATIO}
LV EF 3D SEGMENTATION: 58 %
MCH RBC QN AUTO: 31.1 PG (ref 26.6–33)
MCHC RBC AUTO-ENTMCNC: 33.6 G/DL (ref 31.5–35.7)
MCV RBC AUTO: 92.5 FL (ref 79–97)
PLATELET # BLD AUTO: 167 10*3/MM3 (ref 140–450)
PMV BLD AUTO: 10.9 FL (ref 6–12)
POTASSIUM SERPL-SCNC: 3.8 MMOL/L (ref 3.5–5.2)
PROTHROMBIN TIME: 15.2 SECONDS (ref 11.7–14.2)
QT INTERVAL: 409 MS
QT INTERVAL: 430 MS
QTC INTERVAL: 411 MS
QTC INTERVAL: 467 MS
RBC # BLD AUTO: 3.99 10*6/MM3 (ref 4.14–5.8)
SINUS: 3.2 CM
SODIUM SERPL-SCNC: 139 MMOL/L (ref 136–145)
TRIGL SERPL-MCNC: 33 MG/DL (ref 0–150)
TROPONIN T % DELTA: 13
TROPONIN T NUMERIC DELTA: 36 NG/L
TROPONIN T SERPL HS-MCNC: 1402 NG/L
VLDLC SERPL-MCNC: 8 MG/DL (ref 5–40)
WBC NRBC COR # BLD AUTO: 8.27 10*3/MM3 (ref 3.4–10.8)

## 2025-04-21 PROCEDURE — C1769 GUIDE WIRE: HCPCS | Performed by: INTERNAL MEDICINE

## 2025-04-21 PROCEDURE — 85610 PROTHROMBIN TIME: CPT | Performed by: EMERGENCY MEDICINE

## 2025-04-21 PROCEDURE — 25010000002 FENTANYL CITRATE (PF) 100 MCG/2ML SOLUTION: Performed by: INTERNAL MEDICINE

## 2025-04-21 PROCEDURE — 25810000003 SODIUM CHLORIDE 0.9 % SOLUTION 250 ML FLEX CONT: Performed by: INTERNAL MEDICINE

## 2025-04-21 PROCEDURE — B2111ZZ FLUOROSCOPY OF MULTIPLE CORONARY ARTERIES USING LOW OSMOLAR CONTRAST: ICD-10-PCS | Performed by: INTERNAL MEDICINE

## 2025-04-21 PROCEDURE — C9601 PERC DRUG-EL COR STENT BRAN: HCPCS | Performed by: INTERNAL MEDICINE

## 2025-04-21 PROCEDURE — 80061 LIPID PANEL: CPT | Performed by: INTERNAL MEDICINE

## 2025-04-21 PROCEDURE — C1874 STENT, COATED/COV W/DEL SYS: HCPCS | Performed by: INTERNAL MEDICINE

## 2025-04-21 PROCEDURE — 25810000003 SODIUM CHLORIDE 0.9 % SOLUTION: Performed by: INTERNAL MEDICINE

## 2025-04-21 PROCEDURE — 4A023N7 MEASUREMENT OF CARDIAC SAMPLING AND PRESSURE, LEFT HEART, PERCUTANEOUS APPROACH: ICD-10-PCS | Performed by: INTERNAL MEDICINE

## 2025-04-21 PROCEDURE — 80048 BASIC METABOLIC PNL TOTAL CA: CPT | Performed by: INTERNAL MEDICINE

## 2025-04-21 PROCEDURE — C1887 CATHETER, GUIDING: HCPCS | Performed by: INTERNAL MEDICINE

## 2025-04-21 PROCEDURE — C1894 INTRO/SHEATH, NON-LASER: HCPCS | Performed by: INTERNAL MEDICINE

## 2025-04-21 PROCEDURE — 25510000001 IOPAMIDOL PER 1 ML: Performed by: INTERNAL MEDICINE

## 2025-04-21 PROCEDURE — 25010000002 NITROGLYCERIN 200 MCG/ML SOLUTION: Performed by: EMERGENCY MEDICINE

## 2025-04-21 PROCEDURE — 85347 COAGULATION TIME ACTIVATED: CPT

## 2025-04-21 PROCEDURE — C1725 CATH, TRANSLUMIN NON-LASER: HCPCS | Performed by: INTERNAL MEDICINE

## 2025-04-21 PROCEDURE — 84484 ASSAY OF TROPONIN QUANT: CPT | Performed by: EMERGENCY MEDICINE

## 2025-04-21 PROCEDURE — 93458 L HRT ARTERY/VENTRICLE ANGIO: CPT | Performed by: INTERNAL MEDICINE

## 2025-04-21 PROCEDURE — 99153 MOD SED SAME PHYS/QHP EA: CPT | Performed by: INTERNAL MEDICINE

## 2025-04-21 PROCEDURE — 99152 MOD SED SAME PHYS/QHP 5/>YRS: CPT | Performed by: INTERNAL MEDICINE

## 2025-04-21 PROCEDURE — 92921: CPT | Performed by: INTERNAL MEDICINE

## 2025-04-21 PROCEDURE — 84484 ASSAY OF TROPONIN QUANT: CPT | Performed by: INTERNAL MEDICINE

## 2025-04-21 PROCEDURE — 85730 THROMBOPLASTIN TIME PARTIAL: CPT | Performed by: EMERGENCY MEDICINE

## 2025-04-21 PROCEDURE — C9606 PERC D-E COR REVASC W AMI S: HCPCS | Performed by: INTERNAL MEDICINE

## 2025-04-21 PROCEDURE — 99223 1ST HOSP IP/OBS HIGH 75: CPT | Performed by: INTERNAL MEDICINE

## 2025-04-21 PROCEDURE — 92929 PR PRQ TRLUML CORONARY STENT W/ANGIO ADDL ART/BRNCH: CPT | Performed by: INTERNAL MEDICINE

## 2025-04-21 PROCEDURE — 93010 ELECTROCARDIOGRAM REPORT: CPT | Performed by: INTERNAL MEDICINE

## 2025-04-21 PROCEDURE — 92941 PRQ TRLML REVSC TOT OCCL AMI: CPT | Performed by: INTERNAL MEDICINE

## 2025-04-21 PROCEDURE — 25010000002 MIDAZOLAM PER 1 MG: Performed by: INTERNAL MEDICINE

## 2025-04-21 PROCEDURE — 25010000002 ONDANSETRON PER 1 MG: Performed by: INTERNAL MEDICINE

## 2025-04-21 PROCEDURE — 93356 MYOCRD STRAIN IMG SPCKL TRCK: CPT | Performed by: INTERNAL MEDICINE

## 2025-04-21 PROCEDURE — 93005 ELECTROCARDIOGRAM TRACING: CPT | Performed by: EMERGENCY MEDICINE

## 2025-04-21 PROCEDURE — 25010000002 NITROGLYCERIN 5 MG/ML SOLUTION: Performed by: INTERNAL MEDICINE

## 2025-04-21 PROCEDURE — 25010000002 PHENYLEPHRINE 10 MG/ML SOLUTION 5 ML VIAL: Performed by: INTERNAL MEDICINE

## 2025-04-21 PROCEDURE — 25010000002 HEPARIN (PORCINE) PER 1000 UNITS: Performed by: INTERNAL MEDICINE

## 2025-04-21 PROCEDURE — 93306 TTE W/DOPPLER COMPLETE: CPT | Performed by: INTERNAL MEDICINE

## 2025-04-21 PROCEDURE — 93356 MYOCRD STRAIN IMG SPCKL TRCK: CPT

## 2025-04-21 PROCEDURE — 92921 PR PRQ TRLUML CORONARY ANGIOPLASTY ADDL BRANCH: CPT | Performed by: INTERNAL MEDICINE

## 2025-04-21 PROCEDURE — 93306 TTE W/DOPPLER COMPLETE: CPT

## 2025-04-21 PROCEDURE — 93005 ELECTROCARDIOGRAM TRACING: CPT | Performed by: INTERNAL MEDICINE

## 2025-04-21 PROCEDURE — 85027 COMPLETE CBC AUTOMATED: CPT | Performed by: INTERNAL MEDICINE

## 2025-04-21 PROCEDURE — 25010000002 LIDOCAINE 2% SOLUTION: Performed by: INTERNAL MEDICINE

## 2025-04-21 PROCEDURE — 027135Z DILATION OF CORONARY ARTERY, TWO ARTERIES WITH TWO DRUG-ELUTING INTRALUMINAL DEVICES, PERCUTANEOUS APPROACH: ICD-10-PCS | Performed by: INTERNAL MEDICINE

## 2025-04-21 DEVICE — XIENCE SKYPOINT™ EVEROLIMUS ELUTING CORONARY STENT SYSTEM 3.00 MM X 23 MM / RAPID-EXCHANGE
Type: IMPLANTABLE DEVICE | Status: FUNCTIONAL
Brand: XIENCE SKYPOINT™

## 2025-04-21 DEVICE — XIENCE SKYPOINT™ EVEROLIMUS ELUTING CORONARY STENT SYSTEM 2.50 MM X 23 MM / RAPID-EXCHANGE
Type: IMPLANTABLE DEVICE | Status: FUNCTIONAL
Brand: XIENCE SKYPOINT™

## 2025-04-21 RX ORDER — HEPARIN SODIUM 1000 [USP'U]/ML
INJECTION, SOLUTION INTRAVENOUS; SUBCUTANEOUS
Status: DISCONTINUED | OUTPATIENT
Start: 2025-04-21 | End: 2025-04-21 | Stop reason: HOSPADM

## 2025-04-21 RX ORDER — NITROGLYCERIN 5 MG/ML
INJECTION, SOLUTION INTRAVENOUS
Status: DISCONTINUED | OUTPATIENT
Start: 2025-04-21 | End: 2025-04-21 | Stop reason: HOSPADM

## 2025-04-21 RX ORDER — ONDANSETRON 2 MG/ML
4 INJECTION INTRAMUSCULAR; INTRAVENOUS EVERY 6 HOURS PRN
Status: DISCONTINUED | OUTPATIENT
Start: 2025-04-21 | End: 2025-04-22 | Stop reason: HOSPADM

## 2025-04-21 RX ORDER — SODIUM CHLORIDE 9 MG/ML
3 INJECTION, SOLUTION INTRAVENOUS CONTINUOUS
Status: DISCONTINUED | OUTPATIENT
Start: 2025-04-21 | End: 2025-04-21

## 2025-04-21 RX ORDER — DULOXETIN HYDROCHLORIDE 30 MG/1
60 CAPSULE, DELAYED RELEASE ORAL DAILY
Status: DISCONTINUED | OUTPATIENT
Start: 2025-04-21 | End: 2025-04-22 | Stop reason: HOSPADM

## 2025-04-21 RX ORDER — CARVEDILOL 25 MG/1
25 TABLET ORAL 2 TIMES DAILY WITH MEALS
Status: DISCONTINUED | OUTPATIENT
Start: 2025-04-21 | End: 2025-04-22

## 2025-04-21 RX ORDER — SODIUM CHLORIDE 9 MG/ML
30 INJECTION, SOLUTION INTRAVENOUS CONTINUOUS
Status: DISCONTINUED | OUTPATIENT
Start: 2025-04-21 | End: 2025-04-21

## 2025-04-21 RX ORDER — ACETAMINOPHEN 325 MG/1
650 TABLET ORAL EVERY 4 HOURS PRN
Status: DISCONTINUED | OUTPATIENT
Start: 2025-04-21 | End: 2025-04-22 | Stop reason: HOSPADM

## 2025-04-21 RX ORDER — SODIUM CHLORIDE 9 MG/ML
INJECTION, SOLUTION INTRAVENOUS
Status: COMPLETED | OUTPATIENT
Start: 2025-04-21 | End: 2025-04-21

## 2025-04-21 RX ORDER — ONDANSETRON 2 MG/ML
INJECTION INTRAMUSCULAR; INTRAVENOUS
Status: DISCONTINUED | OUTPATIENT
Start: 2025-04-21 | End: 2025-04-21 | Stop reason: HOSPADM

## 2025-04-21 RX ORDER — NICOTINE 21 MG/24HR
1 PATCH, TRANSDERMAL 24 HOURS TRANSDERMAL DAILY PRN
Status: DISCONTINUED | OUTPATIENT
Start: 2025-04-21 | End: 2025-04-22 | Stop reason: HOSPADM

## 2025-04-21 RX ORDER — IOPAMIDOL 755 MG/ML
INJECTION, SOLUTION INTRAVASCULAR
Status: DISCONTINUED | OUTPATIENT
Start: 2025-04-21 | End: 2025-04-21 | Stop reason: HOSPADM

## 2025-04-21 RX ORDER — ATORVASTATIN CALCIUM 40 MG/1
40 TABLET, FILM COATED ORAL NIGHTLY
Status: DISCONTINUED | OUTPATIENT
Start: 2025-04-21 | End: 2025-04-22 | Stop reason: HOSPADM

## 2025-04-21 RX ORDER — ALUMINA, MAGNESIA, AND SIMETHICONE 2400; 2400; 240 MG/30ML; MG/30ML; MG/30ML
15 SUSPENSION ORAL EVERY 6 HOURS PRN
Status: DISCONTINUED | OUTPATIENT
Start: 2025-04-21 | End: 2025-04-22 | Stop reason: HOSPADM

## 2025-04-21 RX ORDER — FAMOTIDINE 20 MG/1
40 TABLET, FILM COATED ORAL DAILY
Status: DISCONTINUED | OUTPATIENT
Start: 2025-04-21 | End: 2025-04-22 | Stop reason: HOSPADM

## 2025-04-21 RX ORDER — FENTANYL CITRATE 50 UG/ML
INJECTION, SOLUTION INTRAMUSCULAR; INTRAVENOUS
Status: DISCONTINUED | OUTPATIENT
Start: 2025-04-21 | End: 2025-04-21 | Stop reason: HOSPADM

## 2025-04-21 RX ORDER — NITROGLYCERIN 0.4 MG/1
0.4 TABLET SUBLINGUAL
Status: DISCONTINUED | OUTPATIENT
Start: 2025-04-21 | End: 2025-04-22 | Stop reason: HOSPADM

## 2025-04-21 RX ORDER — LOSARTAN POTASSIUM 50 MG/1
100 TABLET ORAL DAILY
Status: DISCONTINUED | OUTPATIENT
Start: 2025-04-21 | End: 2025-04-22

## 2025-04-21 RX ORDER — LEVOTHYROXINE SODIUM 50 UG/1
50 TABLET ORAL DAILY
Status: DISCONTINUED | OUTPATIENT
Start: 2025-04-21 | End: 2025-04-22 | Stop reason: HOSPADM

## 2025-04-21 RX ORDER — MIDAZOLAM HYDROCHLORIDE 1 MG/ML
INJECTION, SOLUTION INTRAMUSCULAR; INTRAVENOUS
Status: DISCONTINUED | OUTPATIENT
Start: 2025-04-21 | End: 2025-04-21 | Stop reason: HOSPADM

## 2025-04-21 RX ORDER — SODIUM CHLORIDE 9 MG/ML
250 INJECTION, SOLUTION INTRAVENOUS ONCE AS NEEDED
Status: DISCONTINUED | OUTPATIENT
Start: 2025-04-21 | End: 2025-04-22 | Stop reason: HOSPADM

## 2025-04-21 RX ORDER — DIPHENHYDRAMINE HCL 25 MG
25 CAPSULE ORAL EVERY 6 HOURS PRN
Status: DISCONTINUED | OUTPATIENT
Start: 2025-04-21 | End: 2025-04-22 | Stop reason: HOSPADM

## 2025-04-21 RX ORDER — LIDOCAINE HYDROCHLORIDE 20 MG/ML
INJECTION, SOLUTION INFILTRATION; PERINEURAL
Status: DISCONTINUED | OUTPATIENT
Start: 2025-04-21 | End: 2025-04-21 | Stop reason: HOSPADM

## 2025-04-21 RX ORDER — ONDANSETRON 4 MG/1
4 TABLET, ORALLY DISINTEGRATING ORAL EVERY 6 HOURS PRN
Status: DISCONTINUED | OUTPATIENT
Start: 2025-04-21 | End: 2025-04-22 | Stop reason: HOSPADM

## 2025-04-21 RX ORDER — ASPIRIN 81 MG/1
81 TABLET ORAL DAILY
Status: DISCONTINUED | OUTPATIENT
Start: 2025-04-21 | End: 2025-04-22 | Stop reason: HOSPADM

## 2025-04-21 RX ADMIN — TICAGRELOR 180 MG: 90 TABLET ORAL at 01:24

## 2025-04-21 RX ADMIN — DULOXETINE 60 MG: 30 CAPSULE, DELAYED RELEASE ORAL at 12:32

## 2025-04-21 RX ADMIN — ATORVASTATIN CALCIUM 40 MG: 40 TABLET, FILM COATED ORAL at 20:17

## 2025-04-21 RX ADMIN — PHENYLEPHRINE HYDROCHLORIDE 0.5 MCG/KG/MIN: 10 INJECTION INTRAVENOUS at 02:47

## 2025-04-21 RX ADMIN — NITROGLYCERIN 5 MCG/MIN: 20 INJECTION INTRAVENOUS at 01:23

## 2025-04-21 RX ADMIN — SODIUM CHLORIDE 3 ML/KG/HR: 9 INJECTION, SOLUTION INTRAVENOUS at 04:27

## 2025-04-21 RX ADMIN — TICAGRELOR 90 MG: 90 TABLET ORAL at 20:17

## 2025-04-21 RX ADMIN — ATORVASTATIN CALCIUM 40 MG: 40 TABLET, FILM COATED ORAL at 04:37

## 2025-04-21 RX ADMIN — TICAGRELOR 90 MG: 90 TABLET ORAL at 12:32

## 2025-04-21 RX ADMIN — FAMOTIDINE 40 MG: 20 TABLET, FILM COATED ORAL at 12:32

## 2025-04-21 RX ADMIN — LEVOTHYROXINE SODIUM 50 MCG: 50 TABLET ORAL at 12:32

## 2025-04-21 RX ADMIN — ASPIRIN 81 MG: 81 TABLET, COATED ORAL at 10:40

## 2025-04-21 RX ADMIN — SODIUM CHLORIDE 30 ML/HR: 9 INJECTION, SOLUTION INTRAVENOUS at 08:00

## 2025-04-21 NOTE — H&P (VIEW-ONLY)
HISTORY AND PHYSICAL      PATIENT IDENTIFICATION    Name: Joseph F Seipel  Age: 65 y.o. Sex: male : 1960  MRN: [id number]    CHIEF COMPLAINT:  Chest pain    HISTORY OF PRESENT ILLNESS:   This is a 65-year-old male with no prior history of ischemic heart disease who presents to the hospital with a complaint of chest pain.  He reports that his chest pain began around 2 PM this afternoon.  It was a waxing and waning type of discomfort.  Onset was without activity.  The patient reports that they had just finished an Easter egg hunt for their grandchildren.  He notes that he was not feeling well and was going to lay down to take a nap.  His symptoms improved and he went on about the day.  He started having symptoms again about the time he went to bed.  He reports that he just did not feel right.  He did not have much in the way of chest discomfort but it was more diaphoresis.  He came to the hospital for evaluation.    In the emergency department he had an EKG performed that had some suspicious changes in the inferior leads however no reciprocal changes were noted.  No prior ECG was noted.  Patient was reporting only nuisance type discomfort at that point.  He declared his discomfort was about a 1.  Cardiac enzymes came back and were elevated.  At that point however the patient was having no chest discomfort.  Repeat ECG was unchanged.  Again no reciprocal changes were appreciated.  Repeat troponin was performed and demonstrated a slight increase.  ECG was beginning to show some potential reciprocal changes.  Patient began having chest discomfort again and so decision was made to proceed with cardiac catheterization.  Patient was started on routine STEMI protocols including heparin and nitroglycerin.  His chest pain completely resolved by the time he reached the Cath Lab.    IMPRESSIONS  Unstable angina/non-STEMI  Solitary kidney  Hypertension  Hyperlipidemia    RECOMMENDATIONS:  Risk benefits and options were  "discussed with the patient.  He wishes to proceed with cardiac catheterization.  Routine STEMI protocols were instituted including heparin and nitroglycerin.    Medical History    Past Medical History:   Diagnosis Date    BPH (benign prostatic hyperplasia)     Dysplasia of one kidney     GERD (gastroesophageal reflux disease)     Hyperlipidemia     Hypertension     IBS (irritable bowel syndrome)     Osteopenia     Sleep apnea     Vitamin D deficiency         Surgical History    Past Surgical History:   Procedure Laterality Date    APPENDECTOMY      THYROID SURGERY          Family History    No family history on file.    Social History    Social History     Tobacco Use    Smoking status: Never     Passive exposure: Never    Smokeless tobacco: Never   Substance Use Topics    Alcohol use: Yes     Alcohol/week: 2.0 standard drinks of alcohol     Types: 2 Cans of beer per week     Comment: occassionally        Allergies    Allergies   Allergen Reactions    Clindamycin Hcl Rash       REVIEW OF SYSTEMS:  Pertinent items are noted in HPI, all other systems reviewed and negative    OBJECTIVE     VITAL SIGNS:  Visit Vitals  /100   Pulse 71   Temp 98.7 °F (37.1 °C) (Oral)   Resp 11   Ht 182.9 cm (72\")   Wt 89.5 kg (197 lb 5 oz)   SpO2 100%   BMI 26.76 kg/m²     Oxygen Therapy  SpO2: 100 %  Pulse Oximetry Type: Intermittent  Device (Oxygen Therapy): nasal cannula  Flow (L/min) (Oxygen Therapy): 3  Flowsheet Rows      Flowsheet Row First Filed Value   Admission Height 182.9 cm (72\") Documented at 04/20/2025 2220   Admission Weight 89.5 kg (197 lb 5 oz) Documented at 04/20/2025 2220          Intake & Output (last 3 days)       None          Lines, Drains & Airways       Active LDAs       Name Placement date Placement time Site Days    Peripheral IV 04/20/25 2258 20 G Anterior;Left Forearm 04/20/25 2258  Forearm  less than 1    Peripheral IV 04/21/25 0111 18 G Anterior;Right Forearm 04/21/25  0111  Forearm  less than 1    " "Arterial Sheath 6 Fr. Right Femoral 04/21/25  0156  Femoral  less than 1                  /100   Pulse 71   Temp 98.7 °F (37.1 °C) (Oral)   Resp 11   Ht 182.9 cm (72\")   Wt 89.5 kg (197 lb 5 oz)   SpO2 100%   BMI 26.76 kg/m²     INTAKE/OUTPUT:    Intake/Output this shift:  No intake/output data recorded.  Intake/Output last 3 shifts:  No intake/output data recorded.      PHYSICAL EXAM:    General: Alert, cooperative, no distress, appears stated age  Head:  Normocephalic, atraumatic, mucous membranes moist  Eyes:  Conjunctivae/corneas clear, EOM's intact     Neck:  Supple,  no bruit  Lungs: Clear to auscultation bilaterally, no wheezes, rhonchi or rales are noted  Chest wall: No tenderness  Heart::  Regular rate and rhythm, S1 and S2 normal, 1/6 holosystolic murmur.  No rub or gallop  Abdomen: Soft, nontender, nondistended, bowel sounds active  Extremities: No cyanosis, clubbing, or edema  Pulses: 2+ and symmetric all extremities  Skin:  No rashes or lesions  Neuro/psych: A&O x3. CN II through XII are grossly intact with appropriate affect    Scheduled Meds:           Continuous Infusions:    heparin, 11.2 Units/kg/hr, Last Rate: 11.2 Units/kg/hr (04/21/25 0034)  nitroglycerin, 5-200 mcg/min, Last Rate: 5 mcg/min (04/21/25 0123)  phenylephrine, 0.5-3 mcg/kg/min, Last Rate: 0.5 mcg/kg/min (04/21/25 0247)        PRN Meds:      [Transfer Hold] heparin    [Transfer Hold] heparin    [Transfer Hold] nitroglycerin    [COMPLETED] Insert Peripheral IV **AND** [Transfer Hold] sodium chloride     Data Review:     X-rays, labs reviewed personally by provider.    CBC    Results from last 7 days   Lab Units 04/20/25  2258   WBC 10*3/mm3 8.65   HEMOGLOBIN g/dL 13.4   PLATELETS 10*3/mm3 164     Cr Clearance Estimated Creatinine Clearance: 61.7 mL/min (A) (by C-G formula based on SCr of 1.47 mg/dL (H)).  Coag   Results from last 7 days   Lab Units 04/21/25  0029   INR  1.21*   APTT seconds 184.9*     HbA1C   Lab Results " "  Component Value Date    HGBA1C 5.70 (H) 10/20/2023    HGBA1C 5.9 (H) 08/28/2021     Blood Glucose No results found for: \"POCGLU\"  Infection     CMP   Results from last 7 days   Lab Units 04/20/25  2258   SODIUM mmol/L 140   POTASSIUM mmol/L 4.0   CHLORIDE mmol/L 105   CO2 mmol/L 24.5   BUN mg/dL 24*   CREATININE mg/dL 1.47*   GLUCOSE mg/dL 137*   ALBUMIN g/dL 4.4   BILIRUBIN mg/dL 0.4   ALK PHOS U/L 69   AST (SGOT) U/L 51*   ALT (SGPT) U/L 37     ABG      UA      MELLISA  No results found for: \"POCMETH\", \"POCAMPHET\", \"POCBARBITUR\", \"POCBENZO\", \"POCCOCAINE\", \"POCOPIATES\", \"POCOXYCODO\", \"POCPHENCYC\", \"POCPROPOXY\", \"POCTHC\", \"POCTRICYC\"  Lysis Labs   Results from last 7 days   Lab Units 04/21/25  0029 04/20/25 2258   INR  1.21*  --    APTT seconds 184.9*  --    HEMOGLOBIN g/dL  --  13.4   PLATELETS 10*3/mm3  --  164   CREATININE mg/dL  --  1.47*     Radiology(recent) XR Chest 1 View  Result Date: 4/21/2025  Impression: No active disease. Electronically Signed: Jose Tang MD  4/21/2025 12:13 AM EDT  Workstation ID: [id number]        Results from last 7 days   Lab Units 04/21/25  0029   HSTROP T ng/L 303*       ECG/EMG Results (most recent)       Procedure Component Value Units Date/Time    ECG 12 Lead Chest Pain [[id number]] Collected: 04/20/25 2224     Updated: 04/20/25 2225     QT Interval 429 ms      QTC Interval 388 ms     Narrative:      HEART RATE=49  bpm  RR Kmoowqwo=8104  ms  IN Feexvhdz=774  ms  P Horizontal Axis=9  deg  P Front Axis=27  deg  QRSD Rhanwjpd=455  ms  QT Jmljzkmp=628  ms  XTsD=787  ms  QRS Axis=-46  deg  T Wave Axis=35  deg  - ABNORMAL ECG -  Sinus bradycardia  RBBB and LAFB  Probable left ventricular hypertrophy  ST elevation, consider inferior injury  Date and Time of Study:2025-04-20 22:24:02    ECG 12 Lead Chest Pain [[id number]] Collected: 04/20/25 2313     Updated: 04/20/25 2314     QT Interval 429 ms      QTC Interval 418 ms     Narrative:      HEART RATE=57  bpm  RR Ksmjlwcu=0007  ms  IN " Hzkjkqoq=198  ms  P Horizontal Axis=23  deg  P Front Axis=41  deg  QRSD Zacupevd=541  ms  QT Quotzlbh=232  ms  GZiS=450  ms  QRS Axis=-51  deg  T Wave Axis=57  deg  - ABNORMAL ECG -  Incomplete analysis due to missing data in precordial lead(s)  Sinus bradycardia  Nonspecific IVCD with LAD  Left ventricular hypertrophy  Inferior infarct, acute (RCA)  Date and Time of Study:2025-04-20 23:13:13    Telemetry Scan [[id number]] Resulted: 04/20/25     Updated: 04/20/25 2347    ECG 12 Lead Chest Pain [[id number]] Collected: 04/21/25 0019     Updated: 04/21/25 0020     QT Interval 407 ms      QTC Interval 409 ms     Narrative:      HEART RATE=61  bpm  RR Xismfqqd=358  ms  NM Cvrjnrcj=507  ms  P Horizontal Axis=22  deg  P Front Axis=37  deg  QRSD Fyashvwz=133  ms  QT Hxikzrcp=591  ms  ASiK=457  ms  QRS Axis=-49  deg  T Wave Axis=78  deg  - ABNORMAL ECG -  Sinus rhythm  RBBB and LAFB  Probable left ventricular hypertrophy  ST elevation secondary to IVCD  Date and Time of Study:2025-04-21 00:19:59            Imaging:  Imaging Results (Last 72 Hours)       Procedure Component Value Units Date/Time    XR Chest 1 View [[id number]] Collected: 04/21/25 0013     Updated: 04/21/25 0015    Narrative:      XR CHEST 1 VW    Date of Exam: 4/20/2025 11:00 PM EDT    Indication: chest pressure    Comparison: None available.    Findings:  There are no airspace consolidations. No pleural fluid. No pneumothorax. The pulmonary vasculature appears within normal limits. The cardiac and mediastinal silhouette appear unremarkable. No acute osseous abnormality identified.      Impression:      Impression:  No active disease.          Electronically Signed: Jose Tang MD    4/21/2025 12:13 AM EDT    Workstation ID: [id number]              Elio Centeno DO  04/21/25  03:24 EDT

## 2025-04-21 NOTE — PLAN OF CARE
Problem: Comorbidity Management  Goal: Blood Pressure in Desired Range  Outcome: Progressing  Intervention: Maintain Blood Pressure Management  Recent Flowsheet Documentation  Taken 4/21/2025 0800 by Carola Fry RN  Medication Review/Management: medications reviewed   Goal Outcome Evaluation:

## 2025-04-21 NOTE — DISCHARGE SUMMARY
[facility] CARDIOLOGY  [facility] [place]    DISCHARGE SUMMARY      Patient Name: Joseph F Seipel  :1960  65 y.o.      Cardiology assessment and plan    Acute coronary syndrome  Status post PCI and stenting of the right coronary artery  Residual significant stenosis involving the LAD  Normal LV systolic function  Hypertension  Hyperlipidemia  Chronic kidney disease  Patient heart rate is somewhat slow but otherwise blood pressure is improved  Denies any new cardiac symptoms  No significant cardiac arrhythmia  Plans to discharge patient home on dual antiplatelet therapy with aspirin and Brilinta  Diagnosis and treatment options reviewed and discussed with patient  Successful complex PCI DANIEL of the RCA/PDA vessels with balloon angioplasty via simultaneous kissing balloons of the ostial PLB  Residual significant disease involving obtuse marginal branch of circumflex-too small for revascularization  Residual significant disease involving the mid LAD  Residual moderate disease involving the distal left main coronary artery  Continue dual medical therapy with aspirin and Brilinta without interruption  Decrease the dose of Coreg  Decrease the dose of losartan  Diagnosis and treatment options reviewed and discussed with patient  Discharge medications include aspirin 81 mg p.o. once a day Brilinta 90 mg p.o. once a day Lipitor 40 mg p.o. once a day Coreg 3.125 mg p.o. twice daily losartan 50 mg p.o. once a day  Groin site looks good with no hematoma or bleeding            Date of Admit: 2025  Date of Discharge:  2025    Discharge Diagnosis:  Problems Addressed this Visit          Cardiac and Vasculature    Acute myocardial infarction - Primary    Relevant Medications    nitroglycerin (TRIDIL) 200 mcg/ml infusion    phenylephrine (JHONNY-SYNEPHRINE) 50 mg in sodium chloride 0.9 % 250 mL infusion    nitroglycerin (NITROSTAT) SL tablet 0.4 mg    ticagrelor (BRILINTA) tablet 90 mg     carvedilol (COREG) tablet 3.125 mg    nitroglycerin (NITROSTAT) 0.4 MG SL tablet    carvedilol (COREG) 3.125 MG tablet (Start on 4/23/2025)    ticagrelor (BRILINTA) 90 MG tablet tablet     Diagnoses         Codes Comments      Acute myocardial infarction, unspecified MI type, unspecified artery    -  Primary ICD-10-CM: I21.9  ICD-9-CM: 410.90           1) NSTEMI s/p PCI to the RCA with residual stenosis LAD planned for staged revascularization  - Troponin peaked 1402  - EKG shows inferior ST elevation without reciprocal changes  - 2D echo 4/21 shows EF = 56-60% with grade 1 diastolic dysfunction and no significant VHD  - continue on uninterrupted DAPT with aspirin and Brilinta  - statin increased to high potency  - on beta blocker; restarted ARB tomorrow    2) HTN    3) HLD    4) KADEN    5) YANI  - Cr 1.47 --> 1.24 --> 1.14  -nephrology consulted      Hospital Course:   This is a 65-year-old male with no prior history of ischemic heart disease who presents to the hospital with a complaint of chest pain.  He reports that his chest pain began around 2 PM this afternoon.  It was a waxing and waning type of discomfort.  Onset was without activity.  The patient reports that they had just finished an Easter egg hunt for their grandchildren.  He notes that he was not feeling well and was going to lay down to take a nap.  His symptoms improved and he went on about the day.  He started having symptoms again about the time he went to bed.  He reports that he just did not feel right.  He did not have much in the way of chest discomfort but it was more diaphoresis.  He came to the hospital for evaluation.     In the emergency department he had an EKG performed that had some suspicious changes in the inferior leads however no reciprocal changes were noted.  No prior ECG was noted.  Patient was reporting only nuisance type discomfort at that point.  He declared his discomfort was about a 1.  Cardiac enzymes came back and were  elevated.  At that point however the patient was having no chest discomfort.  Repeat ECG was unchanged.  Again no reciprocal changes were appreciated.  Repeat troponin was performed and demonstrated a slight increase.  ECG was beginning to show some potential reciprocal changes.  Patient began having chest discomfort again and so decision was made to proceed with cardiac catheterization.  Patient was started on routine STEMI protocols including heparin and nitroglycerin.  His chest pain completely resolved by the time he reached the Cath Lab.    Patient underwent PCI to the RCA with residual LAD stenosis plan for staged revascularization in several weeks.  Will plan to start cardiac rehab thereafter.  2D echo showed a preserved EF without significant VHD.  Renal was consulted for YANI which has improved to Cr 1.14 today.  Patient feels well today.  BP is tolerating lower dose coreg and will plan to restart losartan at a lower dose tomorrow.  Continue on uninterrupted DAPT with aspirin and Brilinta.  As d/w Dr. Braswell, we will discharge home today.         Procedures Performed  Procedure(s):  Left Heart Cath    He underwent cardiac catheterization left heart catheterization with selective coronary angiography, PCI DANIEL PDA x 1, PCI DANIEL RCA x 1 balloon angioplasty of ostium of posterolateral branch with simultaneous kissing balloons, intracoronary nitroglycerin administration, postintervention angiography x 6     Indications for the procedure include: Non-STEMI.      Procedure Details:  The risks, benefits, complications, treatment options, and expected outcomes were discussed with the patient. The patient and/or family concurred with the proposed plan, giving informed consent.      After informed consent the patient was brought to the cath lab after appropriate IV hydration was begun and oral premedication was given. He was further sedated with fentanyl and midazolam. He was prepped and draped in the usual manner.  Using the modified Seldinger access technique and a micropuncture kit, a 6 Malay sheath was placed in the femoral artery. A left heart catheterization with coronary arteriography was performed. Findings are discussed below.     The decision was made to proceed with intervention. He received Heparin as per protocol. The details of the intervention are as follows:     A 6 Malay JR4 guiding catheter with sideholes was placed into the ostium of the right coronary artery.  A 0.014 x 180 Q.branch Prowater PTCA wire was advanced into the distal PDA.  A 2.0 x 15 trek semicompliant balloon was used to predilate the lesion.  The lesion was then treated with a 2.5 x 23 Xience.  Excellent result with ADELA-3 flow and no evidence of dissection or thrombus was noted following PCI.  We then turned our attention to the mid RCA.  This was pretreated with the 2.5 x 23 stent delivery balloon.  Next a 3.0 x 23 Xience drug-eluting stent was deployed.  Postintervention angiography demonstrated an excellent result within the stented region there was however some ostial compromise of the posterolateral branch.  A second Prowater was then advanced into the posterior lateral branch of the RCA. Two 2.0 x 12 semicompliant balloons were then inserted: 1 in the PDA ostium and 1 in the posterolateral branch ostium.  Simultaneous kissing balloons were performed.  Intracoronary nitroglycerin was administered to relieve vasospasm.  Postintervention angiography demonstrated an excellent result with ADELA-3 flow no evidence of dissection or thrombus following PCI.     After the procedure was completed, sedation was stopped and the sheaths and catheters were all removed according to established hospital protocols.     Conscious sedation:  Conscious sedation was performed according to protocol.  I supervised and directed an independent trained observer with the assistance of monitoring the patient's level of consciousness and physiologic status throughout  the procedure.  Intraoperative service time was 56 minutes.     Findings:     Hemodynamics LVEDP: 13  LV: 78/4  Ao: 78/52   Left Main 30 to 40% distal   RCA Diffuse 60 to 70% mid prior to bifurcation of PDA/PLB  100% proximal/ostial PDA   LAD 95% mid with luminal regularities diffusely noted   Circ 85 to 90% proximal third of first obtuse marginal branch-too small for revascularization   SVG(s) Not applicable   EMILY Not applicable    LV Not imaged secondary to renal insufficiency   Coronary dominance RCA         Interventions/Vessels PCI DANIEL PDA RCA  PCI DANIEL PLB RCA  PCI DANIEL RCA   Guides/Wires 6 Wallisian JR4 guide with sideholes  0.014 x 180 Asahi Prowater x 2   Devices 2.0 x 15 trek  2.0 x 12 trek  2.0 x 12 Euphora  2.5 x 23 Xience  3.0 x 23 Xience   Post % Stenosis 0%   Pre-procedure ADELA flow 0   Post procedure ADELA flow 3   Closure Device Not applicable   Complications None immediate      Estimated Blood Loss:  Minimal     Specimens: None         Complications:  None; patient tolerated the procedure well.           Disposition: PACU - hemodynamically stable           Condition: stable     Impressions:  Successful complex PCI DANIEL of the RCA/PDA vessels with balloon angioplasty via simultaneous kissing balloons of the ostial PLB  Residual significant disease involving obtuse marginal branch of circumflex-too small for revascularization  Residual significant disease involving the mid LAD  Residual moderate disease involving the distal left main coronary artery     Recommendations:  Dual antiplatelet therapy consisting of aspirin and ticagrelor  Plan for staged PCI of the LAD in 1 to 2 weeks pending recovery of renal function  2D echocardiogram to evaluate LV function  Observation for 48 hours to monitor for arrhythmias  Hold on antihypertensives secondary to hypotension currently  Increase statin             Consults       Date and Time Order Name Status Description    4/21/2025  9:09 AM Inpatient Nephrology Consult  Completed     4/20/2025 11:51 PM Cardiology (on-call MD unless specified)              Pertinent Test Results:   Results from last 7 days   Lab Units 04/22/25  1054 04/21/25  0427 04/20/25  2258   SODIUM mmol/L 141   < > 140   POTASSIUM mmol/L 3.5   < > 4.0   CHLORIDE mmol/L 107   < > 105   CO2 mmol/L 23.0   < > 24.5   BUN mg/dL 12   < > 24*   CREATININE mg/dL 1.14   < > 1.47*   CALCIUM mg/dL 9.2   < > 9.4   BILIRUBIN mg/dL  --   --  0.4   ALK PHOS U/L  --   --  69   ALT (SGPT) U/L  --   --  37   AST (SGOT) U/L  --   --  51*   GLUCOSE mg/dL 87   < > 137*    < > = values in this interval not displayed.     Results from last 7 days   Lab Units 04/21/25  0427 04/21/25  0029 04/20/25  2258   HSTROP T ng/L 1,402* 303* 267*     @LABRCNT(bnp)@  Results from last 7 days   Lab Units 04/22/25  0756   WBC 10*3/mm3 6.81   HEMOGLOBIN g/dL 13.2   HEMATOCRIT % 40.5   PLATELETS 10*3/mm3 141     Results from last 7 days   Lab Units 04/21/25  0029   INR  1.21*   APTT seconds 184.9*         Results from last 7 days   Lab Units 04/21/25  0427   CHOLESTEROL mg/dL 144   TRIGLYCERIDES mg/dL 33   HDL CHOL mg/dL 36*   LDL CHOL mg/dL 100       ECHOCARDIOGRAM:    Results for orders placed during the hospital encounter of 04/20/25    Adult Transthoracic Echo Complete W/ Cont if Necessary Per Protocol    Interpretation Summary    Left ventricular systolic function is normal. Calculated left ventricular 3D EF = 58% Left ventricular ejection fraction appears to be 56 - 60%.    Left ventricular diastolic function is consistent with (grade I) impaired relaxation.    The right ventricular cavity is mildly dilated.    The left atrial cavity is mildly dilated.    Estimated right ventricular systolic pressure from tricuspid regurgitation is normal (<35 mmHg).      Physical Exam  Neuro: AAOx3, no gross deficits  CV: S1S2 RRR, no murmur  Resp: nonlabored, CTA  GI: BS+, abd soft  Ext: pedal pulses palp, no edema, right groin without hematoma  Tele: SR/SB      Condition on Discharge: stable    Discharge Medications     Discharge Medications        New Medications        Instructions Start Date   aspirin 81 MG EC tablet   81 mg, Oral, Daily   Start Date: April 23, 2025     nitroglycerin 0.4 MG SL tablet  Commonly known as: NITROSTAT   0.4 mg, Sublingual, Every 5 Minutes PRN, Take no more than 3 doses in 15 minutes.      ticagrelor 90 MG tablet tablet  Commonly known as: BRILINTA   90 mg, Oral, 2 Times Daily             Changes to Medications        Instructions Start Date   atorvastatin 40 MG tablet  Commonly known as: LIPITOR  What changed:   medication strength  how much to take  when to take this   40 mg, Oral, Nightly      carvedilol 3.125 MG tablet  Commonly known as: COREG  What changed:   medication strength  how much to take   3.125 mg, Oral, 2 Times Daily With Meals   Start Date: April 23, 2025     losartan 50 MG tablet  Commonly known as: COZAAR  What changed:   medication strength  how much to take   50 mg, Oral, Daily   Start Date: April 23, 2025            Continue These Medications        Instructions Start Date   allopurinol 300 MG tablet  Commonly known as: ZYLOPRIM   TAKE 1 TABLET BY MOUTH EVERY DAY      DULoxetine 60 MG capsule  Commonly known as: CYMBALTA   60 mg, Oral, Daily      famotidine 20 MG tablet  Commonly known as: PEPCID   40 mg, Oral, Daily      finasteride 5 MG tablet  Commonly known as: Proscar   TAKE 1 TABLET BY MOUTH EVERY DAY      fluticasone 50 MCG/ACT nasal spray  Commonly known as: Flonase   instill 1 to 2 sprays in each nostril once daily      levothyroxine 50 MCG tablet  Commonly known as: SYNTHROID, LEVOTHROID   50 mcg, Oral, Daily      Procto-Med HC 2.5 % rectal cream  Generic drug: Hydrocortisone (Perianal)   apply thin layer by topical route 2- 4 times every day as needed             Stop These Medications      clobetasol 0.05 % external solution  Commonly known as: TEMOVATE     ketoconazole 2 % shampoo  Commonly known as:  NIZORAL     ROGAINE EXTRA STRENGTH 5 % solution  Generic drug: Minoxidil              Discharge Diet:     Activity at Discharge:   Activity Instructions       Bathing Restrictions      For one week    Type of Restriction: Bathing    Bathing Restrictions: No Tub Bath    Driving Restrictions      Type of Restriction: Driving    Driving Restrictions: No Driving (Time Limited)    Length: Other    Indicate Length of Restriction: 3 days post heart catheterization    Lifting Restrictions      Type of Restriction: Lifting    Lifting Restrictions: Lifting Restriction (Indicate Limit)    Weight Limit (Pounds): 4    Length of Lifting Restriction: 3 days post heart catherization            Discharge disposition: home    Follow-up Appointments  Future Appointments   Date Time Provider Department Center   5/8/2025  8:00 AM Eliza Baker APRN [facility]         Test Results Pending at Discharge  Pending Results       Procedure [Order ID] Specimen - Date/Time    CBC & Differential [[id number]]     Specimen: Blood     Narrative:      The following orders were created for panel order CBC & Differential.  Procedure                               Abnormality         Status                     ---------                               -----------         ------                     CBC Auto Differential[[id number]]                                                         Please view results for these tests on the individual orders.    CBC Auto Differential [[id number]]     Specimen: Blood              Electronically signed by BRIAN Ferris, 04/22/25, 4:16 PM EDT.     Time: > 30 minutes spent on discharge

## 2025-04-21 NOTE — PAYOR COMM NOTE
"Seipel, Joseph F (65 y.o. Male)       Date of Birth   1960    Social Security Number       Address   [address] [place] [address] [address] [place] [address]    Home Phone   [phone]    MRN   [id number]       Mormon   None    Marital Status                               Admission Date   2025    Admission Type   Emergency    Admitting Provider   Elio Centeno DO    Attending Provider   Elio Centeno DO    Department, Room/Bed   [facility] CARDIOVASCULAR CARE UNIT, 3108/       Discharge Date       Discharge Disposition       Discharge Destination                                 Attending Provider: Elio Centeno DO    Allergies: Clindamycin Hcl    Isolation: None   Infection: None   Code Status: CPR    Ht: 182.9 cm (72\")   Wt: 88.5 kg (195 lb)    Admission Cmt: None   Principal Problem: NSTEMI (non-ST elevated myocardial infarction) [I21.4]                   Active Insurance as of 2025       Primary Coverage       Payor Plan Insurance Group Employer/Plan Group    [employer] [id number]       Payor Plan Address Payor Plan Phone Number Payor Plan Fax Number Effective Dates    [address] [phone]  2020 - None Entered    [place] [id number]         Subscriber Name Subscriber Birth Date Member ID       SEIPEL,JOSEPH F 1960 [id number]                     Emergency Contacts        (Rel.) Home Phone Work Phone Mobile Phone    SEIPEL, CARLA (Spouse) -- -- [phone]                 History & Physical        Elio Centeno DO at [phone]          HISTORY AND PHYSICAL      PATIENT IDENTIFICATION    Name: Joseph F Seipel  Age: 65 y.o. Sex: male : 1960  MRN: [id number]    CHIEF COMPLAINT:  Chest pain    HISTORY OF PRESENT ILLNESS:   This is a 65-year-old male with no prior history of ischemic heart disease who presents to the hospital with a complaint of chest pain. "  He reports that his chest pain began around 2 PM this afternoon.  It was a waxing and waning type of discomfort.  Onset was without activity.  The patient reports that they had just finished an Easter egg hunt for their grandchildren.  He notes that he was not feeling well and was going to lay down to take a nap.  His symptoms improved and he went on about the day.  He started having symptoms again about the time he went to bed.  He reports that he just did not feel right.  He did not have much in the way of chest discomfort but it was more diaphoresis.  He came to the hospital for evaluation.    In the emergency department he had an EKG performed that had some suspicious changes in the inferior leads however no reciprocal changes were noted.  No prior ECG was noted.  Patient was reporting only nuisance type discomfort at that point.  He declared his discomfort was about a 1.  Cardiac enzymes came back and were elevated.  At that point however the patient was having no chest discomfort.  Repeat ECG was unchanged.  Again no reciprocal changes were appreciated.  Repeat troponin was performed and demonstrated a slight increase.  ECG was beginning to show some potential reciprocal changes.  Patient began having chest discomfort again and so decision was made to proceed with cardiac catheterization.  Patient was started on routine STEMI protocols including heparin and nitroglycerin.  His chest pain completely resolved by the time he reached the Cath Lab.    IMPRESSIONS  Unstable angina/non-STEMI  Solitary kidney  Hypertension  Hyperlipidemia    RECOMMENDATIONS:  Risk benefits and options were discussed with the patient.  He wishes to proceed with cardiac catheterization.  Routine STEMI protocols were instituted including heparin and nitroglycerin.    Medical History    Past Medical History:   Diagnosis Date    BPH (benign prostatic hyperplasia)     Dysplasia of one kidney     GERD (gastroesophageal reflux disease)      "Hyperlipidemia     Hypertension     IBS (irritable bowel syndrome)     Osteopenia     Sleep apnea     Vitamin D deficiency         Surgical History    Past Surgical History:   Procedure Laterality Date    APPENDECTOMY      THYROID SURGERY          Family History    No family history on file.    Social History    Social History     Tobacco Use    Smoking status: Never     Passive exposure: Never    Smokeless tobacco: Never   Substance Use Topics    Alcohol use: Yes     Alcohol/week: 2.0 standard drinks of alcohol     Types: 2 Cans of beer per week     Comment: occassionally        Allergies    Allergies   Allergen Reactions    Clindamycin Hcl Rash       REVIEW OF SYSTEMS:  Pertinent items are noted in HPI, all other systems reviewed and negative    OBJECTIVE     VITAL SIGNS:  Visit Vitals  /100   Pulse 71   Temp 98.7 °F (37.1 °C) (Oral)   Resp 11   Ht 182.9 cm (72\")   Wt 89.5 kg (197 lb 5 oz)   SpO2 100%   BMI 26.76 kg/m²     Oxygen Therapy  SpO2: 100 %  Pulse Oximetry Type: Intermittent  Device (Oxygen Therapy): nasal cannula  Flow (L/min) (Oxygen Therapy): 3  Flowsheet Rows      Flowsheet Row First Filed Value   Admission Height 182.9 cm (72\") Documented at 04/20/2025 2220   Admission Weight 89.5 kg (197 lb 5 oz) Documented at 04/20/2025 2220          Intake & Output (last 3 days)       None          Lines, Drains & Airways       Active LDAs       Name Placement date Placement time Site Days    Peripheral IV 04/20/25 2258 20 G Anterior;Left Forearm 04/20/25  2258  Forearm  less than 1    Peripheral IV 04/21/25 0111 18 G Anterior;Right Forearm 04/21/25  0111  Forearm  less than 1    Arterial Sheath 6 Fr. Right Femoral 04/21/25  0156  Femoral  less than 1                  /100   Pulse 71   Temp 98.7 °F (37.1 °C) (Oral)   Resp 11   Ht 182.9 cm (72\")   Wt 89.5 kg (197 lb 5 oz)   SpO2 100%   BMI 26.76 kg/m²     INTAKE/OUTPUT:    Intake/Output this shift:  No intake/output data recorded.  Intake/Output " "last 3 shifts:  No intake/output data recorded.      PHYSICAL EXAM:    General: Alert, cooperative, no distress, appears stated age  Head:  Normocephalic, atraumatic, mucous membranes moist  Eyes:  Conjunctivae/corneas clear, EOM's intact     Neck:  Supple,  no bruit  Lungs: Clear to auscultation bilaterally, no wheezes, rhonchi or rales are noted  Chest wall: No tenderness  Heart::  Regular rate and rhythm, S1 and S2 normal, 1/6 holosystolic murmur.  No rub or gallop  Abdomen: Soft, nontender, nondistended, bowel sounds active  Extremities: No cyanosis, clubbing, or edema  Pulses: 2+ and symmetric all extremities  Skin:  No rashes or lesions  Neuro/psych: A&O x3. CN II through XII are grossly intact with appropriate affect    Scheduled Meds:           Continuous Infusions:    heparin, 11.2 Units/kg/hr, Last Rate: 11.2 Units/kg/hr (04/21/25 0034)  nitroglycerin, 5-200 mcg/min, Last Rate: 5 mcg/min (04/21/25 0123)  phenylephrine, 0.5-3 mcg/kg/min, Last Rate: 0.5 mcg/kg/min (04/21/25 0247)        PRN Meds:      [Transfer Hold] heparin    [Transfer Hold] heparin    [Transfer Hold] nitroglycerin    [COMPLETED] Insert Peripheral IV **AND** [Transfer Hold] sodium chloride     Data Review:     X-rays, labs reviewed personally by provider.    CBC    Results from last 7 days   Lab Units 04/20/25  2258   WBC 10*3/mm3 8.65   HEMOGLOBIN g/dL 13.4   PLATELETS 10*3/mm3 164     Cr Clearance Estimated Creatinine Clearance: 61.7 mL/min (A) (by C-G formula based on SCr of 1.47 mg/dL (H)).  Coag   Results from last 7 days   Lab Units 04/21/25  0029   INR  1.21*   APTT seconds 184.9*     HbA1C   Lab Results   Component Value Date    HGBA1C 5.70 (H) 10/20/2023    HGBA1C 5.9 (H) 08/28/2021     Blood Glucose No results found for: \"POCGLU\"  Infection     CMP   Results from last 7 days   Lab Units 04/20/25  2258   SODIUM mmol/L 140   POTASSIUM mmol/L 4.0   CHLORIDE mmol/L 105   CO2 mmol/L 24.5   BUN mg/dL 24*   CREATININE mg/dL 1.47* " "  GLUCOSE mg/dL 137*   ALBUMIN g/dL 4.4   BILIRUBIN mg/dL 0.4   ALK PHOS U/L 69   AST (SGOT) U/L 51*   ALT (SGPT) U/L 37     ABG      UA      MELLISA  No results found for: \"POCMETH\", \"POCAMPHET\", \"POCBARBITUR\", \"POCBENZO\", \"POCCOCAINE\", \"POCOPIATES\", \"POCOXYCODO\", \"POCPHENCYC\", \"POCPROPOXY\", \"POCTHC\", \"POCTRICYC\"  Lysis Labs   Results from last 7 days   Lab Units 04/21/25  0029 04/20/25  2258   INR  1.21*  --    APTT seconds 184.9*  --    HEMOGLOBIN g/dL  --  13.4   PLATELETS 10*3/mm3  --  164   CREATININE mg/dL  --  1.47*     Radiology(recent) XR Chest 1 View  Result Date: 4/21/2025  Impression: No active disease. Electronically Signed: Jose Tang MD  4/21/2025 12:13 AM EDT  Workstation ID: [id number]        Results from last 7 days   Lab Units 04/21/25  0029   HSTROP T ng/L 303*       ECG/EMG Results (most recent)       Procedure Component Value Units Date/Time    ECG 12 Lead Chest Pain [[id number]] Collected: 04/20/25 2224     Updated: 04/20/25 2225     QT Interval 429 ms      QTC Interval 388 ms     Narrative:      HEART RATE=49  bpm  RR Bwhrskvq=6018  ms  ID Owwgfpvj=686  ms  P Horizontal Axis=9  deg  P Front Axis=27  deg  QRSD Dqtsqnjv=762  ms  QT Wckdazlr=048  ms  PZoU=837  ms  QRS Axis=-46  deg  T Wave Axis=35  deg  - ABNORMAL ECG -  Sinus bradycardia  RBBB and LAFB  Probable left ventricular hypertrophy  ST elevation, consider inferior injury  Date and Time of Study:2025-04-20 22:24:02    ECG 12 Lead Chest Pain [[id number]] Collected: 04/20/25 2313     Updated: 04/20/25 2314     QT Interval 429 ms      QTC Interval 418 ms     Narrative:      HEART RATE=57  bpm  RR Ltylvvfo=9442  ms  ID Kgrtjpce=047  ms  P Horizontal Axis=23  deg  P Front Axis=41  deg  QRSD Svuorltd=987  ms  QT Dvcfrnsn=933  ms  CLxR=476  ms  QRS Axis=-51  deg  T Wave Axis=57  deg  - ABNORMAL ECG -  Incomplete analysis due to missing data in precordial lead(s)  Sinus bradycardia  Nonspecific IVCD with LAD  Left ventricular hypertrophy  Inferior " infarct, acute (RCA)  Date and Time of Study:2025-04-20 23:13:13    Telemetry Scan [[id number]] Resulted: 04/20/25     Updated: 04/20/25 2347    ECG 12 Lead Chest Pain [[id number]] Collected: 04/21/25 0019     Updated: 04/21/25 0020     QT Interval 407 ms      QTC Interval 409 ms     Narrative:      HEART RATE=61  bpm  RR Ctbjvggb=381  ms  GA Rmtxqqnx=109  ms  P Horizontal Axis=22  deg  P Front Axis=37  deg  QRSD Yicctzin=864  ms  QT Iwqkwmoq=477  ms  QDkR=287  ms  QRS Axis=-49  deg  T Wave Axis=78  deg  - ABNORMAL ECG -  Sinus rhythm  RBBB and LAFB  Probable left ventricular hypertrophy  ST elevation secondary to IVCD  Date and Time of Study:2025-04-21 00:19:59            Imaging:  Imaging Results (Last 72 Hours)       Procedure Component Value Units Date/Time    XR Chest 1 View [[id number]] Collected: 04/21/25 0013     Updated: 04/21/25 0015    Narrative:      XR CHEST 1 VW    Date of Exam: 4/20/2025 11:00 PM EDT    Indication: chest pressure    Comparison: None available.    Findings:  There are no airspace consolidations. No pleural fluid. No pneumothorax. The pulmonary vasculature appears within normal limits. The cardiac and mediastinal silhouette appear unremarkable. No acute osseous abnormality identified.      Impression:      Impression:  No active disease.          Electronically Signed: Jose Tang MD    4/21/2025 12:13 AM EDT    Workstation ID: [id number]              Elio Centeno DO  04/21/25  03:24 EDT    Electronically signed by Elio Centeno DO at 04/21/25 0328          Emergency Department Notes        Mike Durand MD at 04/20/25 1183          Subjective   History of Present Illness  66y/o M Patient is an adult male with a history of stage 3 chronic kidney disease and hypertension who presents with diaphoresis (has resolved), nausea, and mild discomfort in the chest area, primarily localized. Symptoms began after dinner around 2 pm and recurred later in the evening around  bedtime. He denies shortness of breath, palpitations, or significant lightheadedness, though he felt slightly faint when symptoms started. He has no prior history of heart disease, stress tests, or cardiac catheterization, and does not follow a cardiologist. Family history is notable for heart disease and heart attacks in both grandfathers and his mother. He is a non-smoker and reports no history of diabetes, though his A1C has been in the prediabetic range (up to 6).    Review of Systems  See HPI.  Past Medical History:   Diagnosis Date    BPH (benign prostatic hyperplasia)     Dysplasia of one kidney     GERD (gastroesophageal reflux disease)     Hyperlipidemia     Hypertension     IBS (irritable bowel syndrome)     Osteopenia     Sleep apnea     Vitamin D deficiency        Allergies   Allergen Reactions    Clindamycin Hcl Rash       Past Surgical History:   Procedure Laterality Date    APPENDECTOMY      THYROID SURGERY         History reviewed. No pertinent family history.    Social History     Socioeconomic History    Marital status:    Tobacco Use    Smoking status: Never     Passive exposure: Never    Smokeless tobacco: Never   Vaping Use    Vaping status: Never Used   Substance and Sexual Activity    Alcohol use: Yes     Alcohol/week: 2.0 standard drinks of alcohol     Types: 2 Cans of beer per week     Comment: occassionally    Drug use: Never    Sexual activity: Defer           Objective   Physical Exam  No acute distress. Normocephalic. No scleral icterus. Moist oral mucosa. No observable neck masses on external visualization. No respiratory distress.  Clear to auscultation bilaterally.  No murmur appreciated.  No tachypnea or increased work of breathing. Normal heart rate. Intact distal pulses. Abdomen soft and nontender without peritoneal signs. Alert and oriented, mild discomfort reported. Normal speech.  Procedures          ED Course      /65 (BP Location: Other (Comment), Patient  "Position: Lying)   Pulse 64   Temp 97.8 °F (36.6 °C) (Oral)   Resp 15   Ht 182.9 cm (72\")   Wt 88.5 kg (195 lb)   SpO2 97%   BMI 26.45 kg/m²   Labs Reviewed   COMPREHENSIVE METABOLIC PANEL - Abnormal; Notable for the following components:       Result Value    Glucose 137 (*)     BUN 24 (*)     Creatinine 1.47 (*)     AST (SGOT) 51 (*)     eGFR 52.0 (*)     All other components within normal limits    Narrative:     GFR Categories in Chronic Kidney Disease (CKD)      GFR Category          GFR (mL/min/1.73)    Interpretation  G1                     90 or greater         Normal or high (1)  G2                      60-89                Mild decrease (1)  G3a                   45-59                Mild to moderate decrease  G3b                   30-44                Moderate to severe decrease  G4                    15-29                Severe decrease  G5                    14 or less           Kidney failure          (1)In the absence of evidence of kidney disease, neither GFR category G1 or G2 fulfill the criteria for CKD.    eGFR calculation 2021 CKD-EPI creatinine equation, which does not include race as a factor   TROPONIN - Abnormal; Notable for the following components:    HS Troponin T 267 (*)     All other components within normal limits    Narrative:     High Sensitive Troponin T Reference Range:  <14.0 ng/L- Negative Female for AMI  <22.0 ng/L- Negative Male for AMI  >=14 - Abnormal Female indicating possible myocardial injury.  >=22 - Abnormal Male indicating possible myocardial injury.   Clinicians would have to utilize clinical acumen, EKG, Troponin, and serial changes to determine if it is an Acute Myocardial Infarction or myocardial injury due to an underlying chronic condition.        CBC WITH AUTO DIFFERENTIAL - Abnormal; Notable for the following components:    Lymphocyte % 19.0 (*)     Monocytes, Absolute 1.04 (*)     All other components within normal limits   HIGH SENSITIVITIY TROPONIN T " 1HR - Abnormal; Notable for the following components:    HS Troponin T 303 (*)     All other components within normal limits    Narrative:     High Sensitive Troponin T Reference Range:  <14.0 ng/L- Negative Female for AMI  <22.0 ng/L- Negative Male for AMI  >=14 - Abnormal Female indicating possible myocardial injury.  >=22 - Abnormal Male indicating possible myocardial injury.   Clinicians would have to utilize clinical acumen, EKG, Troponin, and serial changes to determine if it is an Acute Myocardial Infarction or myocardial injury due to an underlying chronic condition.        PROTIME-INR - Abnormal; Notable for the following components:    Protime 15.2 (*)     INR 1.21 (*)     All other components within normal limits   APTT - Abnormal; Notable for the following components:    .9 (*)     All other components within normal limits   TROPONIN - Abnormal; Notable for the following components:    HS Troponin T 1,402 (*)     All other components within normal limits    Narrative:     High Sensitive Troponin T Reference Range:  <14.0 ng/L- Negative Female for AMI  <22.0 ng/L- Negative Male for AMI  >=14 - Abnormal Female indicating possible myocardial injury.  >=22 - Abnormal Male indicating possible myocardial injury.   Clinicians would have to utilize clinical acumen, EKG, Troponin, and serial changes to determine if it is an Acute Myocardial Infarction or myocardial injury due to an underlying chronic condition.        CBC (NO DIFF) - Abnormal; Notable for the following components:    RBC 3.99 (*)     Hemoglobin 12.4 (*)     Hematocrit 36.9 (*)     All other components within normal limits   BASIC METABOLIC PANEL - Abnormal; Notable for the following components:    Glucose 130 (*)     Chloride 108 (*)     CO2 21.2 (*)     All other components within normal limits    Narrative:     GFR Categories in Chronic Kidney Disease (CKD)      GFR Category          GFR (mL/min/1.73)    Interpretation  G1                      90 or greater         Normal or high (1)  G2                      60-89                Mild decrease (1)  G3a                   45-59                Mild to moderate decrease  G3b                   30-44                Moderate to severe decrease  G4                    15-29                Severe decrease  G5                    14 or less           Kidney failure          (1)In the absence of evidence of kidney disease, neither GFR category G1 or G2 fulfill the criteria for CKD.    eGFR calculation 2021 CKD-EPI creatinine equation, which does not include race as a factor   LIPID PANEL - Abnormal; Notable for the following components:    HDL Cholesterol 36 (*)     All other components within normal limits    Narrative:     Cholesterol Reference Ranges  (U.S. Department of Health and Human Services ATP III Classifications)    Desirable          <200 mg/dL  Borderline High    200-239 mg/dL  High Risk          >240 mg/dL      Triglyceride Reference Ranges  (U.S. Department of Health and Human Services ATP III Classifications)    Normal           <150 mg/dL  Borderline High  150-199 mg/dL  High             200-499 mg/dL  Very High        >500 mg/dL    HDL Reference Ranges  (U.S. Department of Health and Human Services ATP III Classifications)    Low     <40 mg/dl (major risk factor for CHD)  High    >60 mg/dl ('negative' risk factor for CHD)        LDL Reference Ranges  (U.S. Department of Health and Human Services ATP III Classifications)    Optimal          <100 mg/dL  Near Optimal     100-129 mg/dL  Borderline High  130-159 mg/dL  High             160-189 mg/dL  Very High        >189 mg/dL    LDL is calculated using the NIH LDL-C calculation.     POCT ACTIVATED CLOTTING TIME - Abnormal; Notable for the following components:    Activated Clotting Time  147 (*)     All other components within normal limits   POCT ACTIVATED CLOTTING TIME - Abnormal; Notable for the following components:    Activated Clotting Time   268 (*)     All other components within normal limits   POCT ACTIVATED CLOTTING TIME - Abnormal; Notable for the following components:    Activated Clotting Time  199 (*)     All other components within normal limits   POCT ACTIVATED CLOTTING TIME   POCT ACTIVATED CLOTTING TIME   CBC AND DIFFERENTIAL    Narrative:     The following orders were created for panel order CBC & Differential.  Procedure                               Abnormality         Status                     ---------                               -----------         ------                     CBC Auto Differential[[id number]]        Abnormal            Final result                 Please view results for these tests on the individual orders.     Medications   sodium chloride 0.9 % flush 10 mL ( Intravenous MAR Unhold 4/21/25 0338)   nitroglycerin (TRIDIL) 200 mcg/ml infusion ( Intravenous Not Given 4/21/25 8579)   phenylephrine (JHONNY-SYNEPHRINE) 50 mg in sodium chloride 0.9 % 250 mL infusion (0 mcg/kg/min × 89.5 kg Intravenous Stopped 4/21/25 0315)   nitroglycerin (NITROSTAT) SL tablet 0.4 mg (has no administration in time range)   atropine sulfate injection 0.5 mg (has no administration in time range)   sodium chloride 0.9 % infusion 250 mL (has no administration in time range)   sodium chloride 0.9 % infusion (has no administration in time range)   acetaminophen (TYLENOL) tablet 650 mg (has no administration in time range)   aluminum-magnesium hydroxide-simethicone (MAALOX MAX) 400-400-40 MG/5ML suspension 15 mL (has no administration in time range)   ondansetron ODT (ZOFRAN-ODT) disintegrating tablet 4 mg (has no administration in time range)     Or   ondansetron (ZOFRAN) injection 4 mg (has no administration in time range)   nicotine (NICODERM CQ) 21 MG/24HR patch 1 patch (has no administration in time range)   diphenhydrAMINE (BENADRYL) capsule 25 mg (has no administration in time range)   sodium chloride 0.9 % infusion (3 mL/kg/hr × 89.5 kg  Intravenous New Bag 4/21/25 0427)   atorvastatin (LIPITOR) tablet 40 mg (40 mg Oral Given 4/21/25 0437)   ticagrelor (BRILINTA) tablet 90 mg (has no administration in time range)   aspirin EC tablet 81 mg (has no administration in time range)   aspirin chewable tablet 324 mg (324 mg Oral Given 4/20/25 2256)   heparin bolus from bag solution 5,000 Units (5,000 Units Intravenous Bolus from Bag 4/21/25 0001)   ticagrelor (BRILINTA) tablet 180 mg (180 mg Oral Given 4/21/25 0124)   sodium chloride 0.9 % infusion (75 mL/hr Intravenous New Bag 4/21/25 0151)   sodium chloride 0.9 % bolus (999 mL/hr Intravenous Rate/Dose Change 4/21/25 0234)     XR Chest 1 View   Final Result   Impression:   No active disease.               Electronically Signed: Jose Tang MD     4/21/2025 12:13 AM EDT     Workstation ID: [id number]                                                         Medical Decision Making  Problems Addressed:  Acute myocardial infarction, unspecified MI type, unspecified artery: complicated acute illness or injury    Amount and/or Complexity of Data Reviewed  Labs: ordered.  Radiology: ordered.  ECG/medicine tests: ordered.    Risk  OTC drugs.  Prescription drug management.  Decision regarding hospitalization.    EKG interpretation: 10:24 PM, rate 49, sinus bradycardia, right bundle branch block left anterior fascicular block, normal QTc, slight isolated ST elevation in lead III likely secondary to LVH, left axis deviation.  No comparison EKG available.    EKG interpretation #2: Rate 57, sinus bradycardia, right bundle branch block, slight ST elevation lead III persist slightly more convex ST segments in leads II and aVF    EKG interpretation #3: Oh over 19, rate 61, normal sinus rhythm, right bundle branch block left anterior fascicular block, developing some ST elevation in aVF and some in lead II but still not meeting criteria with ST depression now in V2.    My interpretation of chest x-ray is no pneumothorax.   See system for radiology interpretation.    11:25 PM update: Discussed with Dr. Centeno.  Given no reciprocal changes, not going to activate Cath Lab at this time.  Will follow troponin.    Troponin did return elevated.  Patient states he is asymptomatic at this time.  Starting heparin and nitroglycerin.    Discussed repeat troponin and a repeat EKG with Dr. Ball.  Patient states chest pain is now a 1.  Dr. Centeno to take to Cath Lab.    Final diagnoses:   Acute myocardial infarction, unspecified MI type, unspecified artery       ED Disposition  ED Disposition       ED Disposition   Decision to Admit    Condition   --    Comment   --               No follow-up provider specified.       Medication List        ASK your doctor about these medications      allopurinol 300 MG tablet  Commonly known as: ZYLOPRIM  TAKE 1 TABLET BY MOUTH EVERY DAY  Ask about: Which instructions should I use?     atorvastatin 20 MG tablet  Commonly known as: LIPITOR  TAKE 1 TABLET BY MOUTH EVERY DAY  Ask about: Which instructions should I use?     carvedilol 25 MG tablet  Commonly known as: COREG  Take 1 tablet by mouth 2 (Two) Times a Day With Food.  Ask about: Which instructions should I use?     DULoxetine 60 MG capsule  Commonly known as: CYMBALTA  Take 1 capsule by mouth Daily.  Ask about: Which instructions should I use?     famotidine 20 MG tablet  Commonly known as: PEPCID  Take 2 tablets by mouth Daily.  Ask about: Which instructions should I use?     finasteride 5 MG tablet  Commonly known as: Proscar  TAKE 1 TABLET BY MOUTH EVERY DAY  Ask about: Which instructions should I use?     fluticasone 50 MCG/ACT nasal spray  Commonly known as: Flonase  instill 1 to 2 sprays in each nostril once daily  Ask about: Which instructions should I use?     ketoconazole 2 % shampoo  Commonly known as: NIZORAL  apply by topical route three times a week  Ask about: Which instructions should I use?     levothyroxine 50 MCG tablet  Commonly known  as: SYNTHROID, LEVOTHROID  Take 1 tablet by mouth Daily.  Ask about: Which instructions should I use?     losartan 100 MG tablet  Commonly known as: COZAAR  Take 1 tablet by mouth Daily.  Ask about: Which instructions should I use?     Procto-Med HC 2.5 % rectal cream  Generic drug: Hydrocortisone (Perianal)  apply thin layer by topical route 2- 4 times every day as needed  Ask about: Which instructions should I use?     ROGAINE EXTRA STRENGTH 5 % solution  Generic drug: Minoxidil  Apply 1 mL topically to the scalp as directed Daily.  Ask about: Which instructions should I use?                 Mike Durand MD  04/21/25 0827      Electronically signed by Mike Durand MD at 04/21/25 0827       Vital Signs (last day)       Date/Time Temp Temp src Pulse Resp BP Patient Position SpO2    04/21/25 0935 -- -- -- -- 117/65 -- --    04/21/25 0724 97.8 (36.6) Oral -- 15 117/65 Lying --    04/21/25 0600 -- -- 64 -- -- -- 97    04/21/25 0530 -- -- 68 -- -- -- 97    04/21/25 0500 -- -- 68 -- -- -- 98    04/21/25 0430 -- -- 73 -- -- -- 97    04/21/25 0400 -- -- 78 16 -- Lying 99    04/21/25 0345 -- -- 79 -- -- -- 98    04/21/25 0330 -- -- 82 -- -- -- 99    04/21/25 0315 -- -- 88 -- -- -- 97    04/21/25 0312 98 (36.7) Oral 86 16 161/100 Lying 99    04/21/25 01:51:40 -- -- 71 11 138/100 -- 100    04/21/25 0131 -- -- 69 -- 122/77 -- 95    04/21/25 0127 -- -- -- -- -- -- 96    04/21/25 0126 -- -- 71 -- 111/79 -- --    04/21/25 0031 -- -- 63 -- 101/66 -- 96    04/21/25 0001 -- -- 64 -- 94/66 -- 97    04/20/25 2330 -- -- 55 -- -- -- 92    04/20/25 2316 -- -- 78 -- 106/72 -- 92    04/20/25 2303 -- -- 60 -- 99/60 -- 92    04/20/25 2300 -- -- 63 -- 107/70 -- 93    04/20/25 2240 -- -- 54 -- 116/74 -- --    04/20/25 2221 98.7 (37.1) Oral 53 16 122/79 Sitting 95          Physician Progress Notes (all)    No notes of this type exist for this encounter.       Consult Notes (all)    No notes of this type exist for this encounter.           [facility] CATH LAB  [address] [facility] [place] [address] [phone]              Cardiac Catheterization/Vascular Study    Patient Name: Seipel, Joseph F   Patient MRN: [id number]   Patient : 1960 (65 y.o.)   Legal Sex: Male    Accession Number: [id number]   Date of Study: 25   Ordering Provider: Elio Centeno DO    Clinical Indications: STEMI       Performing Physician  Performing Staff   Primary: Elio Centeno DO     Invasive Nurse: Elio Beltran, RN    Invasive Nurse: Lavinia Ernandez RN    Documenter: Sonali Cedeno    Scrub Person: Jana Gurrola           Procedures    Left Heart Cath       Patient Information    Patient Name  Joseph F Seipel MRN  [id number] Legal Sex  Male  (Age)  1960 (65 y.o.)     Race Ethnicity Encounter Category   White or  Not  or  Emergency        [facility]     Show images for Cardiac Catheterization/Vascular Study         Printable Vessel Diagram    Printable Vessel Diagram           Pre Procedure Diagnosis    STEMI         Conclusion    Cardiac Catheterization with PCI Report  PATIENT IDENTIFICATION    Name: Joseph F Seipel  Age: 65 y.o. Sex: male : 1960  MRN: [id number]     Date: 2025  PCP: @PCP@     REQUESTING PROVIDER    [unfilled]     He underwent cardiac catheterization left heart catheterization with selective coronary angiography, PCI DANIEL PDA x 1, PCI DANIEL RCA x 1 balloon angioplasty of ostium of posterolateral branch with simultaneous kissing balloons, intracoronary nitroglycerin administration, postintervention angiography x 6     Indications for the procedure include: Non-STEMI.      Procedure Details:  The risks, benefits, complications, treatment options, and expected outcomes were discussed with the patient. The patient and/or family concurred with the proposed plan, giving informed consent.      After informed consent the patient was brought to the  cath lab after appropriate IV hydration was begun and oral premedication was given. He was further sedated with fentanyl and midazolam. He was prepped and draped in the usual manner. Using the modified Seldinger access technique and a micropuncture kit, a 6 Cameroonian sheath was placed in the femoral artery. A left heart catheterization with coronary arteriography was performed. Findings are discussed below.     The decision was made to proceed with intervention. He received Heparin as per protocol. The details of the intervention are as follows:     A 6 Cameroonian JR4 guiding catheter with sideholes was placed into the ostium of the right coronary artery.  A 0.014 x 180 AlixaRx Prowater PTCA wire was advanced into the distal PDA.  A 2.0 x 15 trek semicompliant balloon was used to predilate the lesion.  The lesion was then treated with a 2.5 x 23 Xience.  Excellent result with ADELA-3 flow and no evidence of dissection or thrombus was noted following PCI.  We then turned our attention to the mid RCA.  This was pretreated with the 2.5 x 23 stent delivery balloon.  Next a 3.0 x 23 Xience drug-eluting stent was deployed.  Postintervention angiography demonstrated an excellent result within the stented region there was however some ostial compromise of the posterolateral branch.  A second Prowater was then advanced into the posterior lateral branch of the RCA. Two 2.0 x 12 semicompliant balloons were then inserted: 1 in the PDA ostium and 1 in the posterolateral branch ostium.  Simultaneous kissing balloons were performed.  Intracoronary nitroglycerin was administered to relieve vasospasm.  Postintervention angiography demonstrated an excellent result with ADELA-3 flow no evidence of dissection or thrombus following PCI.     After the procedure was completed, sedation was stopped and the sheaths and catheters were all removed according to established hospital protocols.     Conscious sedation:  Conscious sedation was performed  according to protocol.  I supervised and directed an independent trained observer with the assistance of monitoring the patient's level of consciousness and physiologic status throughout the procedure.  Intraoperative service time was 56 minutes.     Findings:     Hemodynamics LVEDP: 13  LV: 78/4  Ao: 78/52   Left Main 30 to 40% distal   RCA Diffuse 60 to 70% mid prior to bifurcation of PDA/PLB  100% proximal/ostial PDA   LAD 95% mid with luminal regularities diffusely noted   Circ 85 to 90% proximal third of first obtuse marginal branch-too small for revascularization   SVG(s) Not applicable   EMILY Not applicable    LV Not imaged secondary to renal insufficiency   Coronary dominance RCA         Interventions/Vessels PCI DANIEL PDA RCA  PCI DANIEL PLB RCA  PCI DANIEL RCA   Guides/Wires 6 Azeri JR4 guide with sideholes  0.014 x 180 Asahi Prowater x 2   Devices 2.0 x 15 trek  2.0 x 12 trek  2.0 x 12 Euphora  2.5 x 23 Xience  3.0 x 23 Xience   Post % Stenosis 0%   Pre-procedure ADELA flow 0   Post procedure ADELA flow 3   Closure Device Not applicable   Complications None immediate      Estimated Blood Loss:  Minimal     Specimens: None         Complications:  None; patient tolerated the procedure well.           Disposition: PACU - hemodynamically stable           Condition: stable     Impressions:  Successful complex PCI DANIEL of the RCA/PDA vessels with balloon angioplasty via simultaneous kissing balloons of the ostial PLB  Residual significant disease involving obtuse marginal branch of circumflex-too small for revascularization  Residual significant disease involving the mid LAD  Residual moderate disease involving the distal left main coronary artery     Recommendations:  Dual antiplatelet therapy consisting of aspirin and ticagrelor  Plan for staged PCI of the LAD in 1 to 2 weeks pending recovery of renal function  2D echocardiogram to evaluate LV function  Observation for 48 hours to monitor for arrhythmias  Hold on  antihypertensives secondary to hypotension currently  Increase statin                  Procedure Narrative    Risks, benefits and alternatives were discussed with the patient and/or family. Plan is for moderate sedation. An immediate assessment was done prior to the administration of moderate sedation. Under my direct supervision, intravenous moderate sedation sedation was administered during the course of this procedure with continuous monitoring of hemodynamic parameters and level of consciousness by an independent trained observer. Less than 20 mL of estimated blood loss during the case. A  specimen was collected during the case.       Time Under Physician Observation    Name Panel Role Time Period   Elio Centeno DO Panel 1 Primary 4/21/2025 0150      Total Sedation Time    Total estimated time of moderate sedation was 56 minutes 38 seconds                     Vessel Access    A 6 fr sheath was  inserted using micropuncture technique into the right femoral artery.           Sheath Disposition    Suture was used to secure the sheath post procedure. Transparent Dressing was used to secure the sheath post procedure.  Sheath Left Intact after the procedure.  Pressure Bag was used to stabalize the sheath post procedure.         Stent Inflated    Time Event Details User   2:13 AM Stent Inflated Stnt Cornry Rx Xience/Skypoint Rapdxng 2.89f68vs - First balloon inflation max pressure = 9 antonio. First balloon inflation duration = 20 seconds. JM   2:16 AM Stent Inflated Stnt Cornry Rx Xience/Skypoint Rapdxng 2.18b82jc - Second inflation of balloon - Max pressure = 12 antonio. 2nd Inflation of balloon - Duration = 15 seconds. JM   2:21 AM Stent Inflated Stnt Cornry Rx Xience/Skypoint Rapdxng 3x23mm - First balloon inflation max pressure = 12 antonio. First balloon inflation duration = 15 seconds. Second inflation of balloon - Max pressure = 18 antonio. 2nd Inflation of balloon - Duration = 10 seconds. PATTI Mo  Inflated    Time Event Details User   2:07 AM Balloon Inflated Baln Trek Mini Rx 2x15mm - First balloon inflation max pressure = 8 antonio. First balloon inflation duration = 15 seconds. JM   2:28 AM Balloon Inflated Baln Trek Mini Rx 2x15mm - First balloon inflation max pressure = 10 antonio. First balloon inflation duration = 15 seconds. PATTI   2:31 AM Balloon Inflated Baln Trek Mini Rx 2x15mm - Second inflation of balloon - Max pressure = 10 antonio. 2nd Inflation of balloon - Duration = 10 seconds. PATTI                    Radiation Dose Tracking    Panel Radiation (mSv) Cumulative Air Kerma (mGy) Fluoro Time (min) DAP (mGy-cm2) Physician   Panel 1  1308.000 8.8 [id number] Elio Centeno DO        Total Contrast        Administrations occurring from 0145 to 0258 on 04/21/25:   Medication Name Total Dose   iopamidol (ISOVUE-370) 76 % injection 180 mL         Patient Hx Of Height, Weight, and Vitals    Height Weight BSA (Calculated - sq m) BMI (Calculated) Retired BMI (kg/m2) Pulse BP    88.4 kg (194 lb 14.2 oz)  26.4  88 161/100       Admission Information    Admission Date/Time Discharge Date/Time Room/Bed   04/20/25  2232  3108/1       Medications  (Filter: Administrations occurring from 0000 to 0323 on 04/21/25)   important  Continuous medications are totaled by the amount administered until 04/21/25 0323.       sodium chloride 0.9 % infusion (mL/hr)   Total dose: Cannot be calculated*  *Continuous medication not stopped within the calculation time range.  Date/Time Rate/Dose/Volume Action    04/21/25 0151 75 mL/hr New Bag      fentaNYL citrate (PF) (SUBLIMAZE) injection (mcg)   Total dose: 50 mcg  Date/Time Rate/Dose/Volume Action    04/21/25 0154 50 mcg Given      midazolam (VERSED) injection (mg)   Total dose: 1 mg  Date/Time Rate/Dose/Volume Action    04/21/25 0154 1 mg Given      lidocaine (XYLOCAINE) 2% injection (mL)   Total volume: 17 mL  Date/Time Rate/Dose/Volume Action    04/21/25 0153 17 mL Given       heparin (porcine) injection (Units)   Total dose: 11,000 Units  Date/Time Rate/Dose/Volume Action    04/21/25 0204 8,000 Units Given    0252 3,000 Units Given      nitroglycerin (TRIDIL) injection (mcg)   Total dose: 100 mcg  Date/Time Rate/Dose/Volume Action    04/21/25 0214 100 mcg Given      sodium chloride 0.9 % bolus (mL)   Total volume: 1,249 mL  Date/Time Rate/Dose/Volume Action    04/21/25 0215 250 mL - 999 mL/hr New Bag    0234 999 mL/hr Rate/Dose Change      nitroglycerin (TRIDIL) injection (mcg)   Total dose: 200 mcg  Date/Time Rate/Dose/Volume Action    04/21/25 0222 200 mcg Given      ondansetron (ZOFRAN) injection (mg)   Total dose: 4 mg  Date/Time Rate/Dose/Volume Action    04/21/25 0225 4 mg Given      nitroglycerin (TRIDIL) injection (mcg)   Total dose: 200 mcg  Date/Time Rate/Dose/Volume Action    04/21/25 0233 200 mcg Given      iopamidol (ISOVUE-370) 76 % injection (mL)   Total volume: 180 mL  Date/Time Rate/Dose/Volume Action    04/21/25 0243 180 mL Given      nitroglycerin (TRIDIL) 200 mcg/ml infusion (mcg/min)   Total dose: 600 mcg Dosing weight: 89.5  Date/Time Rate/Dose/Volume Action    04/21/25 0123 5 mcg/min - 1.5 mL/hr New Bag      phenylephrine (JHONNY-SYNEPHRINE) 50 mg in sodium chloride 0.9 % 250 mL infusion (mcg/kg/min)   Total dose: 1.25 mg Dosing weight: 89.5  Date/Time Rate/Dose/Volume Action    04/21/25 0247 0.5 mcg/kg/min - 13.4 mL/hr New Bag    0315  Stopped      heparin 25996 units/250 mL (100 units/mL) in 0.45 % NaCl infusion (Units/kg/hr)   Total dose: 0 Units* Dosing weight: 89.5  *Administration not included in total  Date/Time Rate/Dose/Volume Action    04/21/25 0034 *11.2 Units/kg/hr - 10.02 mL/hr Currently Infusing      heparin bolus from bag solution 2,500 Units (Units)   Total dose: Cannot be calculated* Dosing weight: 89.5  *Administration dose not documented  Date/Time Rate/Dose/Volume Action    04/21/25 0145 *Not included in total MAR Hold      heparin bolus from  bag solution 5,000 Units (Units)   Total dose: Cannot be calculated* Dosing weight: 89.5  *Administration dose not documented  Date/Time Rate/Dose/Volume Action    04/21/25 0145 *Not included in total MAR Hold      nitroglycerin (NITROSTAT) SL tablet 0.4 mg (mg)   Total dose: Cannot be calculated* Dosing weight: 89.5  *Administration dose not documented  Date/Time Rate/Dose/Volume Action    04/21/25 0145 *Not included in total MAR Hold        Implants    Type Not Specified      Stnt Cornry Rx Xience/Skypoint Rapdxng 2.49k94jq - [id number] - Implanted    Inventory item: STNT CORNRY RX XIENCE/SKYPOINT RAPDXNG 2.19K77XS Model/Cat number: [id number]   : [employer] Lot number: [id number]     As of 4/21/2025    Status: Implanted       Stnt Cornry Rx Xience/Skypoint Rapdxng 3x23mm - [id number] - Implanted    Inventory item: STNT CORNRY RX XIENCE/SKYPOINT RAPDXNG 3X23MM Model/Cat number: [id number]   : [employer] Lot number: [id number]     As of 4/21/2025    Status: Implanted         Supplies    Name ID Temporary Type Charge Code Description Charge Code Quantity   SYR LL TP 10ML STRL 36552 No Supply   1   ST ACC MICROPUNCTURE STFF/REYES PLAT/TP 4F 21G 40CM 54030 No Supply HC OR SUPPLY SHELL 272/ 158341 2   CATH DIAG IMPULSE PIG .056 6F 110CM 83629 No Diagnostic Catheter HC OR SUPPLY SHELL 272/NO CPT 358321 2   GW DIAG EMERALD HEPCOAT MOVE JTIP STD .035 3MM 150CM 05670 No Guidewire HC OR SUPPLY SHELL 272/ 276023 1   GW PTFE EMERALD HEPCOAT FC J TIP STD .035 3MM 150CM 40215 No Guidewire HC OR SUPPLY SHELL 272/ 423054 1   PK TRY HEART CATH 50 00622 No Supply   2   CONTRST [id number] 61PCT 50ML 49358 No Supply   1   BOWL PLSTC MD 16OZ YANET STRL 81927 No Supply   1   DEV INFL COMPAK W/ACCESSPLUS NJ2078 43455 No Supply HC OR SUPPLY SHELL 272/NO CPT 387498 1   BALN TREK MINI RX 2X15MM 88313 No Balloon HC OR SUPPLY [place] 272/ 564534 1   HUNG TAMELADANIAL 2X12MM 33959 No Supply HC OR SUPPLY  SHELL 272/ 894723 2   CATH DIAG IMPULSE FL4 6F 100CM 61281 No Diagnostic Catheter HC OR SUPPLY SHELL 272/NO CPT 278564 2   CATH DIAG IMPULSE FR4 6F 100CM 35395 No Diagnostic Catheter HC OR SUPPLY SHELL 272/NO CPT 038418 2   CATH GUIDE MACH I SD HL 6F FR4 68245 No Guide Catheter HC OR SUPPLY SHELL 272/ 334726 1   SHEATH INTRO PINN CORNRY .038 6F10CM 05617 No Supply HC OR SUPPLY SHELL 272/ 637640 2   ELECTRD DEFIB M/FUNC PROPADZ RADIOL 2PK 73883 No Supply HC OR SUPPLY SHELL 272/NO CPT 508969 1   CUP MEDS GRAD PRE/PKGD 2OZ STRL 745322 No Supply   1   STPCK 3WY HP ROT 170569 No Supply HC OR SUPPLY SHELL 272/NO CPT 747491 1   TBG NAMIC PRESS MONTR A/ F/M 12IN 624402 No Supply   1   GW PTCA PROWATERFLEX J TP .014IN 180CM 793671 No Guidewire HC OR SUPPLY SHELL 272/ 644637 2       Signed    Electronically signed by Elio Centeno DO on 4/21/25 at 0323 EDT     Cardiac Catheterization/Vascular Study  Order: [id number]   Status: Final result      Result Care Coordination      Patient Communication     Released  Not seen              Link to Procedure Log    Procedure Log        Order-Level Documents:    Scan on 4/21/2025 0258 by [employer], [place]: CARDIAC CATH HEMO DATA - SCAN               Results Routing Tracking    View Results Routing Information     Order Report     Order Details

## 2025-04-21 NOTE — CONSULTS
NEPHROLOGY CONSULTATION-----[facility]    [facility]  [phone]  Donell Mary MD    Patient Care Team:  Rakel Lan MD as PCP - General    CC/REASON FOR CONSULTATION: Elevated creatinine    PHYSICIAN REQUESTING CONSULTATION: Elio Centeno *     History of Present Illness  65 male with past medical of CKD stage III, hypertension, hyperlipidemia, presented with angina type symptoms with diaphoresis nausea.  He underwent heart cath and stent placement.  His creatinine was 1.4 yesterday, 1.2 today.  He is followed by my partner Dr. Her for CKD in office.    Review of Systems   As noted above otherwise 10 systems were reviewed and were negative.    Past Medical History:   Diagnosis Date    BPH (benign prostatic hyperplasia)     Dysplasia of one kidney     GERD (gastroesophageal reflux disease)     Hyperlipidemia     Hypertension     IBS (irritable bowel syndrome)     Osteopenia     Sleep apnea     Vitamin D deficiency        Past Surgical History:   Procedure Laterality Date    APPENDECTOMY      THYROID SURGERY         History reviewed. No pertinent family history.    Social History     Tobacco Use    Smoking status: Never     Passive exposure: Never    Smokeless tobacco: Never   Vaping Use    Vaping status: Never Used   Substance Use Topics    Alcohol use: Yes     Alcohol/week: 2.0 standard drinks of alcohol     Types: 2 Cans of beer per week     Comment: occassionally    Drug use: Never       Home Meds:   Medications Prior to Admission   Medication Sig Dispense Refill Last Dose/Taking    allopurinol (ZYLOPRIM) 300 MG tablet TAKE 1 TABLET BY MOUTH EVERY DAY 90 tablet 3 4/20/2025    atorvastatin (LIPITOR) 20 MG tablet TAKE 1 TABLET BY MOUTH EVERY DAY 90 tablet 1 4/20/2025    carvedilol (COREG) 25 MG tablet Take 1 tablet by mouth 2 (Two) Times a Day With Food. 180 tablet 3 4/20/2025    DULoxetine (CYMBALTA) 60 MG capsule Take 1 capsule  by mouth Daily. 90 capsule 0 4/20/2025    famotidine (PEPCID) 20 MG tablet Take 2 tablets by mouth Daily. 180 tablet 3 4/20/2025    finasteride (Proscar) 5 MG tablet TAKE 1 TABLET BY MOUTH EVERY DAY 90 tablet 0 4/20/2025    levothyroxine (SYNTHROID, LEVOTHROID) 50 MCG tablet Take 1 tablet by mouth Daily. 90 tablet 1 4/20/2025    losartan (COZAAR) 100 MG tablet Take 1 tablet by mouth Daily. 90 tablet 3 4/20/2025    Minoxidil (Minoxidil for Men) 5 % solution Apply 1 mL topically to the scalp as directed Daily. 60 mL 12 4/20/2025    clobetasol (TEMOVATE) 0.05 % external solution Apply topically to the affected scalp area in the morning and evening 50 mL 12 More than a month    fluticasone (Flonase) 50 MCG/ACT nasal spray instill 1 to 2 sprays in each nostril once daily 48 g 0 More than a month    Hydrocortisone, Perianal, (Proctosol HC) 2.5 % rectal cream apply thin layer by topical route 2- 4 times every day as needed 90 g 3 More than a month    ketoconazole (NIZORAL) 2 % shampoo apply by topical route three times a week 120 mL 5 More than a month       Scheduled Meds:  aspirin, 81 mg, Oral, Daily  atorvastatin, 40 mg, Oral, Nightly  [Held by provider] carvedilol, 25 mg, Oral, BID With Meals  DULoxetine, 60 mg, Oral, Daily  famotidine, 40 mg, Oral, Daily  levothyroxine, 50 mcg, Oral, Daily  [Held by provider] losartan, 100 mg, Oral, Daily  ticagrelor, 90 mg, Oral, BID        Continuous Infusions:  nitroglycerin, 5-200 mcg/min, Last Rate: 5 mcg/min (04/21/25 0123)  phenylephrine, 0.5-3 mcg/kg/min, Last Rate: Stopped (04/21/25 0315)  sodium chloride, 30 mL/hr, Last Rate: 30 mL/hr (04/21/25 0800)        PRN Meds:    acetaminophen    aluminum-magnesium hydroxide-simethicone    atropine    diphenhydrAMINE    nicotine    nitroglycerin    ondansetron ODT **OR** ondansetron    [COMPLETED] Insert Peripheral IV **AND** sodium chloride    sodium chloride    Allergies:  Clindamycin hcl    OBJECTIVE    Vital Signs  Temp:  [97.8 °F  "(36.6 °C)-98.7 °F (37.1 °C)] 97.8 °F (36.6 °C)  Heart Rate:  [53-88] 64  Resp:  [11-16] 15  BP: ()/() 117/65    No intake/output data recorded.  I/O last 3 completed shifts:  In: 1894 [P.O.:480; I.V.:1414]  Out: 0     Physical Exam:  General Appearance: alert, appears stated age and cooperative  Head: normocephalic, without obvious abnormality and atraumatic  Eyes: conjunctivae and sclerae normal and no icterus  Neck: supple and no JVD  Lungs: clear to auscultation and respirations regular  Heart: regular rhythm & normal rate and normal S1, S2  Chest Wall: no abnormalities observed  Abdomen: normal bowel sounds and soft, nontender  Extremities: moves extremities well, no edema, no cyanosis  Skin: no bleeding, bruising or rash  Neurologic: Alert, and oriented. No focal deficits    Results Review:    I reviewed the patient's new clinical results.    WBC WBC   Date Value Ref Range Status   04/21/2025 8.27 3.40 - 10.80 10*3/mm3 Final   04/20/2025 8.65 3.40 - 10.80 10*3/mm3 Final      HGB Hemoglobin   Date Value Ref Range Status   04/21/2025 12.4 (L) 13.0 - 17.7 g/dL Final   04/20/2025 13.4 13.0 - 17.7 g/dL Final      HCT Hematocrit   Date Value Ref Range Status   04/21/2025 36.9 (L) 37.5 - 51.0 % Final   04/20/2025 40.5 37.5 - 51.0 % Final      Platelets No results found for: \"LABPLAT\"   MCV MCV   Date Value Ref Range Status   04/21/2025 92.5 79.0 - 97.0 fL Final   04/20/2025 93.1 79.0 - 97.0 fL Final          Sodium Sodium   Date Value Ref Range Status   04/21/2025 139 136 - 145 mmol/L Final   04/20/2025 140 136 - 145 mmol/L Final      Potassium Potassium   Date Value Ref Range Status   04/21/2025 3.8 3.5 - 5.2 mmol/L Final   04/20/2025 4.0 3.5 - 5.2 mmol/L Final      Chloride Chloride   Date Value Ref Range Status   04/21/2025 108 (H) 98 - 107 mmol/L Final   04/20/2025 105 98 - 107 mmol/L Final      CO2 CO2   Date Value Ref Range Status   04/21/2025 21.2 (L) 22.0 - 29.0 mmol/L Final   04/20/2025 24.5 22.0 " "- 29.0 mmol/L Final      BUN BUN   Date Value Ref Range Status   04/21/2025 19 8 - 23 mg/dL Final   04/20/2025 24 (H) 8 - 23 mg/dL Final      Creatinine Creatinine   Date Value Ref Range Status   04/21/2025 1.24 0.76 - 1.27 mg/dL Final   04/20/2025 1.47 (H) 0.76 - 1.27 mg/dL Final      Calcium Calcium   Date Value Ref Range Status   04/21/2025 8.9 8.6 - 10.5 mg/dL Final   04/20/2025 9.4 8.6 - 10.5 mg/dL Final      PO4 No results found for: \"CAPO4\"   Albumin Albumin   Date Value Ref Range Status   04/20/2025 4.4 3.5 - 5.2 g/dL Final      Magnesium No results found for: \"MG\"   Uric Acid No results found for: \"URICACID\"       Imaging Results (Last 72 Hours)       Procedure Component Value Units Date/Time    XR Chest 1 View [[id number]] Collected: 04/21/25 0013     Updated: 04/21/25 0015    Narrative:      XR CHEST 1 VW    Date of Exam: 4/20/2025 11:00 PM EDT    Indication: chest pressure    Comparison: None available.    Findings:  There are no airspace consolidations. No pleural fluid. No pneumothorax. The pulmonary vasculature appears within normal limits. The cardiac and mediastinal silhouette appear unremarkable. No acute osseous abnormality identified.      Impression:      Impression:  No active disease.          Electronically Signed: Jose Tang MD    4/21/2025 12:13 AM EDT    Workstation ID: [id number]              Results for orders placed during the hospital encounter of 04/20/25    XR Chest 1 View    Narrative  XR CHEST 1 VW    Date of Exam: 4/20/2025 11:00 PM EDT    Indication: chest pressure    Comparison: None available.    Findings:  There are no airspace consolidations. No pleural fluid. No pneumothorax. The pulmonary vasculature appears within normal limits. The cardiac and mediastinal silhouette appear unremarkable. No acute osseous abnormality identified.    Impression  Impression:  No active disease.          Electronically Signed: Jose Tang MD  4/21/2025 12:13 AM EDT  Workstation ID: " [id number]            ASSESSMENT / PLAN      NSTEMI (non-ST elevated myocardial infarction)    Acute myocardial infarction      CKD stage III-CKD due to hypertensive nephrosclerosis.  Creatinine close to baseline  Hypertension  Hyperlipidemia  Acute MI-status post heart cath and PCI    Creatinine stable post cath  Okay for discharge from renal standpoint  His blood pressure is soft, can hold or reduce losartan to 50 mg  Will follow-up in office with Dr. Her in 2 to 3 weeks      I discussed the patient's findings and my recommendations with patient    Will follow along closely. Thank you for allowing us to see this patient in renal consultation.    [facility]  [phone]  MD Donell Obiren MD  04/21/25  11:33 EDT

## 2025-04-21 NOTE — PLAN OF CARE
Problem: Adult Inpatient Plan of Care  Goal: Plan of Care Review  Outcome: Progressing  Goal: Patient-Specific Goal (Individualized)  Outcome: Progressing  Goal: Absence of Hospital-Acquired Illness or Injury  Outcome: Progressing  Intervention: Identify and Manage Fall Risk  Recent Flowsheet Documentation  Taken 4/21/2025 0400 by Jody Ruffin RN  Safety Promotion/Fall Prevention:   assistive device/personal items within reach   clutter free environment maintained   fall prevention program maintained   muscle strengthening facilitated   room organization consistent   safety round/check completed  Intervention: Prevent Skin Injury  Recent Flowsheet Documentation  Taken 4/21/2025 0400 by Jody Ruffin RN  Body Position:   reverse Trendelenburg   supine  Skin Protection:   transparent dressing maintained   skin sealant/moisture barrier applied   pulse oximeter probe site changed  Intervention: Prevent Infection  Recent Flowsheet Documentation  Taken 4/21/2025 0400 by Jody Ruffin RN  Infection Prevention:   environmental surveillance performed   equipment surfaces disinfected   hand hygiene promoted   personal protective equipment utilized   rest/sleep promoted   single patient room provided  Goal: Optimal Comfort and Wellbeing  Outcome: Progressing  Intervention: Monitor Pain and Promote Comfort  Recent Flowsheet Documentation  Taken 4/21/2025 0400 by Jody Ruffin RN  Pain Management Interventions: care clustered  Intervention: Provide Person-Centered Care  Recent Flowsheet Documentation  Taken 4/21/2025 0400 by Jody Ruffin RN  Trust Relationship/Rapport:   care explained   choices provided   questions answered   questions encouraged  Goal: Readiness for Transition of Care  Outcome: Progressing  Intervention: Mutually Develop Transition Plan  Recent Flowsheet Documentation  Taken 4/21/2025 0448 by Jody Ruffin RN  Equipment Currently Used at Home: none  Taken 4/21/2025 0400 by  Jody Ruffin, RN  Transportation Anticipated: family or friend will provide  Patient/Family Anticipated Services at Transition: none  Patient/Family Anticipates Transition to: home   Goal Outcome Evaluation:

## 2025-04-21 NOTE — CASE MANAGEMENT/SOCIAL WORK
Discharge Planning Assessment   [place]     Patient Name: Joseph F Seipel  MRN: [id number]  Today's Date: 4/21/2025    Admit Date: 4/20/2025    Plan: VASILE Plan: Anticipate routine home with spouse Raya.   Discharge Needs Assessment       Row Name 04/21/25 1430       Living Environment    People in Home spouse    Name(s) of People in Home Carla Seipel    Current Living Arrangements home    Potentially Unsafe Housing Conditions none    In the past 12 months has the electric, gas, oil, or water company threatened to shut off services in your home? No    Primary Care Provided by self    Provides Primary Care For no one    Family Caregiver if Needed spouse    Family Caregiver Names Carla Seipel    Quality of Family Relationships helpful;involved;supportive    Able to Return to Prior Arrangements yes       Resource/Environmental Concerns    Resource/Environmental Concerns none    Transportation Concerns none       Transportation Needs    In the past 12 months, has lack of transportation kept you from medical appointments or from getting medications? no    In the past 12 months, has lack of transportation kept you from meetings, work, or from getting things needed for daily living? No       Food Insecurity    Within the past 12 months, you worried that your food would run out before you got the money to buy more. Never true    Within the past 12 months, the food you bought just didn't last and you didn't have money to get more. Never true       Transition Planning    Patient/Family Anticipates Transition to home with family    Patient/Family Anticipated Services at Transition none    Transportation Anticipated car, drives self;family or friend will provide       Discharge Needs Assessment    Readmission Within the Last 30 Days no previous admission in last 30 days    Equipment Currently Used at Home cpap    Concerns to be Addressed discharge planning    Do you want help finding or keeping work or a job? I do not need or  want help    Do you want help with school or training? For example, starting or completing job training or getting a high school diploma, GED or equivalent No    Anticipated Changes Related to Illness none    Equipment Needed After Discharge none    Provided Post Acute Provider List? N/A    Provided Post Acute Provider Quality & Resource List? N/A                   Discharge Plan       Row Name 04/21/25 1431       Plan    Plan DC Plan: Anticipate routine home with spouse Raya.    Patient/Family in Agreement with Plan yes    Provided Post Acute Provider List? N/A    Provided Post Acute Provider Quality & Resource List? N/A    Plan Comments CM spoke with patient at bedside to discuss admission assessment and discharge planning. Patient confirms PCP and pharmacy. Patient is already enrolled in meds to bed program. Patient denies any difficulty affording medications or food at this time. Patient denies any additional needs for services or DME at this time. Patient reports independent with ADL's and drives. Patient spouse will provide transportation when ready for DC.CM reviewed chart documentation for clinical updates. See Pharmacy note for Brilinta Test Claim information. Heart Cath today.CM will continue to follow for any further needs. DC Barriers: Post cath site monitoring, Cardiac monitoring, O2@2L nc, Nitro gtt, and monitor labs.                    Expected Discharge Date and Time       Expected Discharge Date Expected Discharge Time    Apr 22, 2025            Demographic Summary       Row Name 04/21/25 1428       General Information    Admission Type inpatient    Arrived From emergency department;home    Referral Source admission list    Reason for Consult discharge planning    Preferred Language English       Contact Information    Permission Granted to Share Info With                    Functional Status       Row Name 04/21/25 1429       Functional Status    Usual Activity Tolerance excellent     Current Activity Tolerance excellent       Physical Activity    On average, how many days per week do you engage in moderate to strenuous exercise (like a brisk walk)? 0 days    On average, how many minutes do you engage in exercise at this level? 0 min    Number of minutes of exercise per week 0       Functional Status, IADL    Medications independent    Meal Preparation independent    Housekeeping independent    Laundry independent    Shopping independent    If for any reason you need help with day-to-day activities such as bathing, preparing meals, shopping, managing finances, etc., do you get the help you need? 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

## 2025-04-21 NOTE — PHARMACY PATIENT ASSISTANCE
Transitions of Care (test claim):    Drug Brilinta:   Covered/PA Required: Covered without PA  Copay Per Month: $447.36  Is the medication eligible for a trial card/copay card? Copay card available from  Maximum savings per card is $200 for 30 days    Please note that when it states medication is eligible for a trail card that this only means that the medication has a free trial available. This does not assess if the patient has already utilized the once in a lifetime free trial.     This test claim coverage is only valid on the date the note is published. Copay/coverage could vary depending on patient coverage at a later date.  Additionally this test claim is for the sole purpose of a price check not a clinical recommendation.     For billing questions please call [facility] Pharmacist at ext: [phone]  For M2B questions please call Retail Pharmacy at ext: [phone]    Evelia Brown PharmD

## 2025-04-21 NOTE — ED PROVIDER NOTES
Subjective   History of Present Illness  66y/o M Patient is an adult male with a history of stage 3 chronic kidney disease and hypertension who presents with diaphoresis (has resolved), nausea, and mild discomfort in the chest area, primarily localized. Symptoms began after dinner around 2 pm and recurred later in the evening around bedtime. He denies shortness of breath, palpitations, or significant lightheadedness, though he felt slightly faint when symptoms started. He has no prior history of heart disease, stress tests, or cardiac catheterization, and does not follow a cardiologist. Family history is notable for heart disease and heart attacks in both grandfathers and his mother. He is a non-smoker and reports no history of diabetes, though his A1C has been in the prediabetic range (up to 6).    Review of Systems  See HPI.  Past Medical History:   Diagnosis Date    BPH (benign prostatic hyperplasia)     Dysplasia of one kidney     GERD (gastroesophageal reflux disease)     Hyperlipidemia     Hypertension     IBS (irritable bowel syndrome)     Osteopenia     Sleep apnea     Vitamin D deficiency        Allergies   Allergen Reactions    Clindamycin Hcl Rash       Past Surgical History:   Procedure Laterality Date    APPENDECTOMY      THYROID SURGERY         History reviewed. No pertinent family history.    Social History     Socioeconomic History    Marital status:    Tobacco Use    Smoking status: Never     Passive exposure: Never    Smokeless tobacco: Never   Vaping Use    Vaping status: Never Used   Substance and Sexual Activity    Alcohol use: Yes     Alcohol/week: 2.0 standard drinks of alcohol     Types: 2 Cans of beer per week     Comment: occassionally    Drug use: Never    Sexual activity: Defer           Objective   Physical Exam  No acute distress. Normocephalic. No scleral icterus. Moist oral mucosa. No observable neck masses on external visualization. No respiratory distress.  Clear to  "auscultation bilaterally.  No murmur appreciated.  No tachypnea or increased work of breathing. Normal heart rate. Intact distal pulses. Abdomen soft and nontender without peritoneal signs. Alert and oriented, mild discomfort reported. Normal speech.  Procedures           ED Course      /65 (BP Location: Other (Comment), Patient Position: Lying)   Pulse 64   Temp 97.8 °F (36.6 °C) (Oral)   Resp 15   Ht 182.9 cm (72\")   Wt 88.5 kg (195 lb)   SpO2 97%   BMI 26.45 kg/m²   Labs Reviewed   COMPREHENSIVE METABOLIC PANEL - Abnormal; Notable for the following components:       Result Value    Glucose 137 (*)     BUN 24 (*)     Creatinine 1.47 (*)     AST (SGOT) 51 (*)     eGFR 52.0 (*)     All other components within normal limits    Narrative:     GFR Categories in Chronic Kidney Disease (CKD)      GFR Category          GFR (mL/min/1.73)    Interpretation  G1                     90 or greater         Normal or high (1)  G2                      60-89                Mild decrease (1)  G3a                   45-59                Mild to moderate decrease  G3b                   30-44                Moderate to severe decrease  G4                    15-29                Severe decrease  G5                    14 or less           Kidney failure          (1)In the absence of evidence of kidney disease, neither GFR category G1 or G2 fulfill the criteria for CKD.    eGFR calculation 2021 CKD-EPI creatinine equation, which does not include race as a factor   TROPONIN - Abnormal; Notable for the following components:    HS Troponin T 267 (*)     All other components within normal limits    Narrative:     High Sensitive Troponin T Reference Range:  <14.0 ng/L- Negative Female for AMI  <22.0 ng/L- Negative Male for AMI  >=14 - Abnormal Female indicating possible myocardial injury.  >=22 - Abnormal Male indicating possible myocardial injury.   Clinicians would have to utilize clinical acumen, EKG, Troponin, and serial " changes to determine if it is an Acute Myocardial Infarction or myocardial injury due to an underlying chronic condition.        CBC WITH AUTO DIFFERENTIAL - Abnormal; Notable for the following components:    Lymphocyte % 19.0 (*)     Monocytes, Absolute 1.04 (*)     All other components within normal limits   HIGH SENSITIVITIY TROPONIN T 1HR - Abnormal; Notable for the following components:    HS Troponin T 303 (*)     All other components within normal limits    Narrative:     High Sensitive Troponin T Reference Range:  <14.0 ng/L- Negative Female for AMI  <22.0 ng/L- Negative Male for AMI  >=14 - Abnormal Female indicating possible myocardial injury.  >=22 - Abnormal Male indicating possible myocardial injury.   Clinicians would have to utilize clinical acumen, EKG, Troponin, and serial changes to determine if it is an Acute Myocardial Infarction or myocardial injury due to an underlying chronic condition.        PROTIME-INR - Abnormal; Notable for the following components:    Protime 15.2 (*)     INR 1.21 (*)     All other components within normal limits   APTT - Abnormal; Notable for the following components:    .9 (*)     All other components within normal limits   TROPONIN - Abnormal; Notable for the following components:    HS Troponin T 1,402 (*)     All other components within normal limits    Narrative:     High Sensitive Troponin T Reference Range:  <14.0 ng/L- Negative Female for AMI  <22.0 ng/L- Negative Male for AMI  >=14 - Abnormal Female indicating possible myocardial injury.  >=22 - Abnormal Male indicating possible myocardial injury.   Clinicians would have to utilize clinical acumen, EKG, Troponin, and serial changes to determine if it is an Acute Myocardial Infarction or myocardial injury due to an underlying chronic condition.        CBC (NO DIFF) - Abnormal; Notable for the following components:    RBC 3.99 (*)     Hemoglobin 12.4 (*)     Hematocrit 36.9 (*)     All other components  within normal limits   BASIC METABOLIC PANEL - Abnormal; Notable for the following components:    Glucose 130 (*)     Chloride 108 (*)     CO2 21.2 (*)     All other components within normal limits    Narrative:     GFR Categories in Chronic Kidney Disease (CKD)      GFR Category          GFR (mL/min/1.73)    Interpretation  G1                     90 or greater         Normal or high (1)  G2                      60-89                Mild decrease (1)  G3a                   45-59                Mild to moderate decrease  G3b                   30-44                Moderate to severe decrease  G4                    15-29                Severe decrease  G5                    14 or less           Kidney failure          (1)In the absence of evidence of kidney disease, neither GFR category G1 or G2 fulfill the criteria for CKD.    eGFR calculation 2021 CKD-EPI creatinine equation, which does not include race as a factor   LIPID PANEL - Abnormal; Notable for the following components:    HDL Cholesterol 36 (*)     All other components within normal limits    Narrative:     Cholesterol Reference Ranges  (U.S. Department of Health and Human Services ATP III Classifications)    Desirable          <200 mg/dL  Borderline High    200-239 mg/dL  High Risk          >240 mg/dL      Triglyceride Reference Ranges  (U.S. Department of Health and Human Services ATP III Classifications)    Normal           <150 mg/dL  Borderline High  150-199 mg/dL  High             200-499 mg/dL  Very High        >500 mg/dL    HDL Reference Ranges  (U.S. Department of Health and Human Services ATP III Classifications)    Low     <40 mg/dl (major risk factor for CHD)  High    >60 mg/dl ('negative' risk factor for CHD)        LDL Reference Ranges  (U.S. Department of Health and Human Services ATP III Classifications)    Optimal          <100 mg/dL  Near Optimal     100-129 mg/dL  Borderline High  130-159 mg/dL  High             160-189 mg/dL  Very High         >189 mg/dL    LDL is calculated using the NIH LDL-C calculation.     POCT ACTIVATED CLOTTING TIME - Abnormal; Notable for the following components:    Activated Clotting Time  147 (*)     All other components within normal limits   POCT ACTIVATED CLOTTING TIME - Abnormal; Notable for the following components:    Activated Clotting Time  268 (*)     All other components within normal limits   POCT ACTIVATED CLOTTING TIME - Abnormal; Notable for the following components:    Activated Clotting Time  199 (*)     All other components within normal limits   POCT ACTIVATED CLOTTING TIME   POCT ACTIVATED CLOTTING TIME   CBC AND DIFFERENTIAL    Narrative:     The following orders were created for panel order CBC & Differential.  Procedure                               Abnormality         Status                     ---------                               -----------         ------                     CBC Auto Differential[[id number]]        Abnormal            Final result                 Please view results for these tests on the individual orders.     Medications   sodium chloride 0.9 % flush 10 mL ( Intravenous MAR Unhold 4/21/25 0338)   nitroglycerin (TRIDIL) 200 mcg/ml infusion ( Intravenous Not Given 4/21/25 0359)   phenylephrine (JHONNY-SYNEPHRINE) 50 mg in sodium chloride 0.9 % 250 mL infusion (0 mcg/kg/min × 89.5 kg Intravenous Stopped 4/21/25 0315)   nitroglycerin (NITROSTAT) SL tablet 0.4 mg (has no administration in time range)   atropine sulfate injection 0.5 mg (has no administration in time range)   sodium chloride 0.9 % infusion 250 mL (has no administration in time range)   sodium chloride 0.9 % infusion (has no administration in time range)   acetaminophen (TYLENOL) tablet 650 mg (has no administration in time range)   aluminum-magnesium hydroxide-simethicone (MAALOX MAX) 400-400-40 MG/5ML suspension 15 mL (has no administration in time range)   ondansetron ODT (ZOFRAN-ODT) disintegrating tablet 4 mg (has no  administration in time range)     Or   ondansetron (ZOFRAN) injection 4 mg (has no administration in time range)   nicotine (NICODERM CQ) 21 MG/24HR patch 1 patch (has no administration in time range)   diphenhydrAMINE (BENADRYL) capsule 25 mg (has no administration in time range)   sodium chloride 0.9 % infusion (3 mL/kg/hr × 89.5 kg Intravenous New Bag 4/21/25 0427)   atorvastatin (LIPITOR) tablet 40 mg (40 mg Oral Given 4/21/25 0437)   ticagrelor (BRILINTA) tablet 90 mg (has no administration in time range)   aspirin EC tablet 81 mg (has no administration in time range)   aspirin chewable tablet 324 mg (324 mg Oral Given 4/20/25 2256)   heparin bolus from bag solution 5,000 Units (5,000 Units Intravenous Bolus from Bag 4/21/25 0001)   ticagrelor (BRILINTA) tablet 180 mg (180 mg Oral Given 4/21/25 0124)   sodium chloride 0.9 % infusion (75 mL/hr Intravenous New Bag 4/21/25 0151)   sodium chloride 0.9 % bolus (999 mL/hr Intravenous Rate/Dose Change 4/21/25 0234)     XR Chest 1 View   Final Result   Impression:   No active disease.               Electronically Signed: Jose Tang MD     4/21/2025 12:13 AM EDT     Workstation ID: [id number]                                                         Medical Decision Making  Problems Addressed:  Acute myocardial infarction, unspecified MI type, unspecified artery: complicated acute illness or injury    Amount and/or Complexity of Data Reviewed  Labs: ordered.  Radiology: ordered.  ECG/medicine tests: ordered.    Risk  OTC drugs.  Prescription drug management.  Decision regarding hospitalization.    EKG interpretation: 10:24 PM, rate 49, sinus bradycardia, right bundle branch block left anterior fascicular block, normal QTc, slight isolated ST elevation in lead III likely secondary to LVH, left axis deviation.  No comparison EKG available.    EKG interpretation #2: Rate 57, sinus bradycardia, right bundle branch block, slight ST elevation lead III persist slightly more  convex ST segments in leads II and aVF    EKG interpretation #3: Oh over 19, rate 61, normal sinus rhythm, right bundle branch block left anterior fascicular block, developing some ST elevation in aVF and some in lead II but still not meeting criteria with ST depression now in V2.    My interpretation of chest x-ray is no pneumothorax.  See system for radiology interpretation.    11:25 PM update: Discussed with Dr. Centeno.  Given no reciprocal changes, not going to activate Cath Lab at this time.  Will follow troponin.    Troponin did return elevated.  Patient states he is asymptomatic at this time.  Starting heparin and nitroglycerin.    Discussed repeat troponin and a repeat EKG with Dr. Ball.  Patient states chest pain is now a 1.  Dr. Centeno to take to Cath Lab.    Final diagnoses:   Acute myocardial infarction, unspecified MI type, unspecified artery       ED Disposition  ED Disposition       ED Disposition   Decision to Admit    Condition   --    Comment   --               No follow-up provider specified.       Medication List        ASK your doctor about these medications      allopurinol 300 MG tablet  Commonly known as: ZYLOPRIM  TAKE 1 TABLET BY MOUTH EVERY DAY  Ask about: Which instructions should I use?     atorvastatin 20 MG tablet  Commonly known as: LIPITOR  TAKE 1 TABLET BY MOUTH EVERY DAY  Ask about: Which instructions should I use?     carvedilol 25 MG tablet  Commonly known as: COREG  Take 1 tablet by mouth 2 (Two) Times a Day With Food.  Ask about: Which instructions should I use?     DULoxetine 60 MG capsule  Commonly known as: CYMBALTA  Take 1 capsule by mouth Daily.  Ask about: Which instructions should I use?     famotidine 20 MG tablet  Commonly known as: PEPCID  Take 2 tablets by mouth Daily.  Ask about: Which instructions should I use?     finasteride 5 MG tablet  Commonly known as: Proscar  TAKE 1 TABLET BY MOUTH EVERY DAY  Ask about: Which instructions should I use?     fluticasone  50 MCG/ACT nasal spray  Commonly known as: Flonase  instill 1 to 2 sprays in each nostril once daily  Ask about: Which instructions should I use?     ketoconazole 2 % shampoo  Commonly known as: NIZORAL  apply by topical route three times a week  Ask about: Which instructions should I use?     levothyroxine 50 MCG tablet  Commonly known as: SYNTHROID, LEVOTHROID  Take 1 tablet by mouth Daily.  Ask about: Which instructions should I use?     losartan 100 MG tablet  Commonly known as: COZAAR  Take 1 tablet by mouth Daily.  Ask about: Which instructions should I use?     Procto-Med HC 2.5 % rectal cream  Generic drug: Hydrocortisone (Perianal)  apply thin layer by topical route 2- 4 times every day as needed  Ask about: Which instructions should I use?     ROGAINE EXTRA STRENGTH 5 % solution  Generic drug: Minoxidil  Apply 1 mL topically to the scalp as directed Daily.  Ask about: Which instructions should I use?                 Mike Durand MD  04/21/25 1714

## 2025-04-21 NOTE — H&P
HISTORY AND PHYSICAL      PATIENT IDENTIFICATION    Name: Joseph F Seipel  Age: 65 y.o. Sex: male : 1960  MRN: [id number]    CHIEF COMPLAINT:  Chest pain    HISTORY OF PRESENT ILLNESS:   This is a 65-year-old male with no prior history of ischemic heart disease who presents to the hospital with a complaint of chest pain.  He reports that his chest pain began around 2 PM this afternoon.  It was a waxing and waning type of discomfort.  Onset was without activity.  The patient reports that they had just finished an Easter egg hunt for their grandchildren.  He notes that he was not feeling well and was going to lay down to take a nap.  His symptoms improved and he went on about the day.  He started having symptoms again about the time he went to bed.  He reports that he just did not feel right.  He did not have much in the way of chest discomfort but it was more diaphoresis.  He came to the hospital for evaluation.    In the emergency department he had an EKG performed that had some suspicious changes in the inferior leads however no reciprocal changes were noted.  No prior ECG was noted.  Patient was reporting only nuisance type discomfort at that point.  He declared his discomfort was about a 1.  Cardiac enzymes came back and were elevated.  At that point however the patient was having no chest discomfort.  Repeat ECG was unchanged.  Again no reciprocal changes were appreciated.  Repeat troponin was performed and demonstrated a slight increase.  ECG was beginning to show some potential reciprocal changes.  Patient began having chest discomfort again and so decision was made to proceed with cardiac catheterization.  Patient was started on routine STEMI protocols including heparin and nitroglycerin.  His chest pain completely resolved by the time he reached the Cath Lab.    IMPRESSIONS  Unstable angina/non-STEMI  Solitary kidney  Hypertension  Hyperlipidemia    RECOMMENDATIONS:  Risk benefits and options were  "discussed with the patient.  He wishes to proceed with cardiac catheterization.  Routine STEMI protocols were instituted including heparin and nitroglycerin.    Medical History    Past Medical History:   Diagnosis Date    BPH (benign prostatic hyperplasia)     Dysplasia of one kidney     GERD (gastroesophageal reflux disease)     Hyperlipidemia     Hypertension     IBS (irritable bowel syndrome)     Osteopenia     Sleep apnea     Vitamin D deficiency         Surgical History    Past Surgical History:   Procedure Laterality Date    APPENDECTOMY      THYROID SURGERY          Family History    No family history on file.    Social History    Social History     Tobacco Use    Smoking status: Never     Passive exposure: Never    Smokeless tobacco: Never   Substance Use Topics    Alcohol use: Yes     Alcohol/week: 2.0 standard drinks of alcohol     Types: 2 Cans of beer per week     Comment: occassionally        Allergies    Allergies   Allergen Reactions    Clindamycin Hcl Rash       REVIEW OF SYSTEMS:  Pertinent items are noted in HPI, all other systems reviewed and negative    OBJECTIVE     VITAL SIGNS:  Visit Vitals  /100   Pulse 71   Temp 98.7 °F (37.1 °C) (Oral)   Resp 11   Ht 182.9 cm (72\")   Wt 89.5 kg (197 lb 5 oz)   SpO2 100%   BMI 26.76 kg/m²     Oxygen Therapy  SpO2: 100 %  Pulse Oximetry Type: Intermittent  Device (Oxygen Therapy): nasal cannula  Flow (L/min) (Oxygen Therapy): 3  Flowsheet Rows      Flowsheet Row First Filed Value   Admission Height 182.9 cm (72\") Documented at 04/20/2025 2220   Admission Weight 89.5 kg (197 lb 5 oz) Documented at 04/20/2025 2220          Intake & Output (last 3 days)       None          Lines, Drains & Airways       Active LDAs       Name Placement date Placement time Site Days    Peripheral IV 04/20/25 2258 20 G Anterior;Left Forearm 04/20/25 2258  Forearm  less than 1    Peripheral IV 04/21/25 0111 18 G Anterior;Right Forearm 04/21/25  0111  Forearm  less than 1    " "Arterial Sheath 6 Fr. Right Femoral 04/21/25  0156  Femoral  less than 1                  /100   Pulse 71   Temp 98.7 °F (37.1 °C) (Oral)   Resp 11   Ht 182.9 cm (72\")   Wt 89.5 kg (197 lb 5 oz)   SpO2 100%   BMI 26.76 kg/m²     INTAKE/OUTPUT:    Intake/Output this shift:  No intake/output data recorded.  Intake/Output last 3 shifts:  No intake/output data recorded.      PHYSICAL EXAM:    General: Alert, cooperative, no distress, appears stated age  Head:  Normocephalic, atraumatic, mucous membranes moist  Eyes:  Conjunctivae/corneas clear, EOM's intact     Neck:  Supple,  no bruit  Lungs: Clear to auscultation bilaterally, no wheezes, rhonchi or rales are noted  Chest wall: No tenderness  Heart::  Regular rate and rhythm, S1 and S2 normal, 1/6 holosystolic murmur.  No rub or gallop  Abdomen: Soft, nontender, nondistended, bowel sounds active  Extremities: No cyanosis, clubbing, or edema  Pulses: 2+ and symmetric all extremities  Skin:  No rashes or lesions  Neuro/psych: A&O x3. CN II through XII are grossly intact with appropriate affect    Scheduled Meds:           Continuous Infusions:    heparin, 11.2 Units/kg/hr, Last Rate: 11.2 Units/kg/hr (04/21/25 0034)  nitroglycerin, 5-200 mcg/min, Last Rate: 5 mcg/min (04/21/25 0123)  phenylephrine, 0.5-3 mcg/kg/min, Last Rate: 0.5 mcg/kg/min (04/21/25 0247)        PRN Meds:      [Transfer Hold] heparin    [Transfer Hold] heparin    [Transfer Hold] nitroglycerin    [COMPLETED] Insert Peripheral IV **AND** [Transfer Hold] sodium chloride     Data Review:     X-rays, labs reviewed personally by provider.    CBC    Results from last 7 days   Lab Units 04/20/25  2258   WBC 10*3/mm3 8.65   HEMOGLOBIN g/dL 13.4   PLATELETS 10*3/mm3 164     Cr Clearance Estimated Creatinine Clearance: 61.7 mL/min (A) (by C-G formula based on SCr of 1.47 mg/dL (H)).  Coag   Results from last 7 days   Lab Units 04/21/25  0029   INR  1.21*   APTT seconds 184.9*     HbA1C   Lab Results " "  Component Value Date    HGBA1C 5.70 (H) 10/20/2023    HGBA1C 5.9 (H) 08/28/2021     Blood Glucose No results found for: \"POCGLU\"  Infection     CMP   Results from last 7 days   Lab Units 04/20/25  2258   SODIUM mmol/L 140   POTASSIUM mmol/L 4.0   CHLORIDE mmol/L 105   CO2 mmol/L 24.5   BUN mg/dL 24*   CREATININE mg/dL 1.47*   GLUCOSE mg/dL 137*   ALBUMIN g/dL 4.4   BILIRUBIN mg/dL 0.4   ALK PHOS U/L 69   AST (SGOT) U/L 51*   ALT (SGPT) U/L 37     ABG      UA      MELLISA  No results found for: \"POCMETH\", \"POCAMPHET\", \"POCBARBITUR\", \"POCBENZO\", \"POCCOCAINE\", \"POCOPIATES\", \"POCOXYCODO\", \"POCPHENCYC\", \"POCPROPOXY\", \"POCTHC\", \"POCTRICYC\"  Lysis Labs   Results from last 7 days   Lab Units 04/21/25  0029 04/20/25 2258   INR  1.21*  --    APTT seconds 184.9*  --    HEMOGLOBIN g/dL  --  13.4   PLATELETS 10*3/mm3  --  164   CREATININE mg/dL  --  1.47*     Radiology(recent) XR Chest 1 View  Result Date: 4/21/2025  Impression: No active disease. Electronically Signed: Jose Tang MD  4/21/2025 12:13 AM EDT  Workstation ID: [id number]        Results from last 7 days   Lab Units 04/21/25  0029   HSTROP T ng/L 303*       ECG/EMG Results (most recent)       Procedure Component Value Units Date/Time    ECG 12 Lead Chest Pain [[id number]] Collected: 04/20/25 2224     Updated: 04/20/25 2225     QT Interval 429 ms      QTC Interval 388 ms     Narrative:      HEART RATE=49  bpm  RR Sxhtuyea=1938  ms  OH Ollirjlr=636  ms  P Horizontal Axis=9  deg  P Front Axis=27  deg  QRSD Trzcxkwc=203  ms  QT Rrmouamk=847  ms  TPmM=847  ms  QRS Axis=-46  deg  T Wave Axis=35  deg  - ABNORMAL ECG -  Sinus bradycardia  RBBB and LAFB  Probable left ventricular hypertrophy  ST elevation, consider inferior injury  Date and Time of Study:2025-04-20 22:24:02    ECG 12 Lead Chest Pain [[id number]] Collected: 04/20/25 2313     Updated: 04/20/25 2314     QT Interval 429 ms      QTC Interval 418 ms     Narrative:      HEART RATE=57  bpm  RR Zhvwlual=0244  ms  OH " Yywbsmef=892  ms  P Horizontal Axis=23  deg  P Front Axis=41  deg  QRSD Mjhetkct=314  ms  QT Vaazdxzz=776  ms  VWkB=951  ms  QRS Axis=-51  deg  T Wave Axis=57  deg  - ABNORMAL ECG -  Incomplete analysis due to missing data in precordial lead(s)  Sinus bradycardia  Nonspecific IVCD with LAD  Left ventricular hypertrophy  Inferior infarct, acute (RCA)  Date and Time of Study:2025-04-20 23:13:13    Telemetry Scan [[id number]] Resulted: 04/20/25     Updated: 04/20/25 2347    ECG 12 Lead Chest Pain [[id number]] Collected: 04/21/25 0019     Updated: 04/21/25 0020     QT Interval 407 ms      QTC Interval 409 ms     Narrative:      HEART RATE=61  bpm  RR Fqsrfyge=821  ms  [place] Bmcfztsh=185  ms  P Horizontal Axis=22  deg  P Front Axis=37  deg  QRSD Nusyyakk=450  ms  QT Cwsubuvu=552  ms  VRtY=123  ms  QRS Axis=-49  deg  T Wave Axis=78  deg  - ABNORMAL ECG -  Sinus rhythm  RBBB and LAFB  Probable left ventricular hypertrophy  ST elevation secondary to IVCD  Date and Time of Study:2025-04-21 00:19:59            Imaging:  Imaging Results (Last 72 Hours)       Procedure Component Value Units Date/Time    XR Chest 1 View [[id number]] Collected: 04/21/25 0013     Updated: 04/21/25 0015    Narrative:      XR CHEST 1 VW    Date of Exam: 4/20/2025 11:00 PM EDT    Indication: chest pressure    Comparison: None available.    Findings:  There are no airspace consolidations. No pleural fluid. No pneumothorax. The pulmonary vasculature appears within normal limits. The cardiac and mediastinal silhouette appear unremarkable. No acute osseous abnormality identified.      Impression:      Impression:  No active disease.          Electronically Signed: Jose Tang MD    4/21/2025 12:13 AM EDT    Workstation ID: [id number]              Elio Centeno DO  04/21/25  03:24 EDT

## 2025-04-22 ENCOUNTER — PREP FOR SURGERY (OUTPATIENT)
Dept: OTHER | Facility: HOSPITAL | Age: 65
End: 2025-04-22
Payer: COMMERCIAL

## 2025-04-22 ENCOUNTER — READMISSION MANAGEMENT (OUTPATIENT)
Dept: CALL CENTER | Facility: HOSPITAL | Age: 65
End: 2025-04-22
Payer: COMMERCIAL

## 2025-04-22 VITALS
HEIGHT: 72 IN | OXYGEN SATURATION: 98 % | TEMPERATURE: 98.9 F | RESPIRATION RATE: 10 BRPM | DIASTOLIC BLOOD PRESSURE: 89 MMHG | HEART RATE: 72 BPM | BODY MASS INDEX: 26.41 KG/M2 | SYSTOLIC BLOOD PRESSURE: 141 MMHG | WEIGHT: 195 LBS

## 2025-04-22 DIAGNOSIS — I25.10 CAD (CORONARY ARTERY DISEASE): Primary | ICD-10-CM

## 2025-04-22 PROBLEM — Z98.61 CAD S/P PERCUTANEOUS CORONARY ANGIOPLASTY: Status: ACTIVE | Noted: 2025-04-22

## 2025-04-22 LAB
ANION GAP SERPL CALCULATED.3IONS-SCNC: 11 MMOL/L (ref 5–15)
BUN SERPL-MCNC: 12 MG/DL (ref 8–23)
BUN/CREAT SERPL: 10.5 (ref 7–25)
CALCIUM SPEC-SCNC: 9.2 MG/DL (ref 8.6–10.5)
CHLORIDE SERPL-SCNC: 107 MMOL/L (ref 98–107)
CO2 SERPL-SCNC: 23 MMOL/L (ref 22–29)
CREAT SERPL-MCNC: 1.14 MG/DL (ref 0.76–1.27)
DEPRECATED RDW RBC AUTO: 49.7 FL (ref 37–54)
EGFRCR SERPLBLD CKD-EPI 2021: 70.5 ML/MIN/1.73
ERYTHROCYTE [DISTWIDTH] IN BLOOD BY AUTOMATED COUNT: 14.3 % (ref 12.3–15.4)
GLUCOSE SERPL-MCNC: 87 MG/DL (ref 65–99)
HCT VFR BLD AUTO: 40.5 % (ref 37.5–51)
HGB BLD-MCNC: 13.2 G/DL (ref 13–17.7)
MCH RBC QN AUTO: 31.3 PG (ref 26.6–33)
MCHC RBC AUTO-ENTMCNC: 32.6 G/DL (ref 31.5–35.7)
MCV RBC AUTO: 96 FL (ref 79–97)
PLATELET # BLD AUTO: 141 10*3/MM3 (ref 140–450)
PMV BLD AUTO: 10.8 FL (ref 6–12)
POTASSIUM SERPL-SCNC: 3.5 MMOL/L (ref 3.5–5.2)
QT INTERVAL: 407 MS
QT INTERVAL: 428 MS
QT INTERVAL: 429 MS
QTC INTERVAL: 388 MS
QTC INTERVAL: 391 MS
QTC INTERVAL: 409 MS
RBC # BLD AUTO: 4.22 10*6/MM3 (ref 4.14–5.8)
SODIUM SERPL-SCNC: 141 MMOL/L (ref 136–145)
WBC NRBC COR # BLD AUTO: 6.81 10*3/MM3 (ref 3.4–10.8)

## 2025-04-22 PROCEDURE — 99239 HOSP IP/OBS DSCHRG MGMT >30: CPT | Performed by: INTERNAL MEDICINE

## 2025-04-22 PROCEDURE — 93005 ELECTROCARDIOGRAM TRACING: CPT | Performed by: INTERNAL MEDICINE

## 2025-04-22 PROCEDURE — 80048 BASIC METABOLIC PNL TOTAL CA: CPT | Performed by: INTERNAL MEDICINE

## 2025-04-22 PROCEDURE — 85027 COMPLETE CBC AUTOMATED: CPT | Performed by: INTERNAL MEDICINE

## 2025-04-22 PROCEDURE — 93010 ELECTROCARDIOGRAM REPORT: CPT | Performed by: INTERNAL MEDICINE

## 2025-04-22 RX ORDER — LOSARTAN POTASSIUM 50 MG/1
50 TABLET ORAL DAILY
Qty: 30 TABLET | Refills: 5 | Status: SHIPPED | OUTPATIENT
Start: 2025-04-23 | End: 2025-05-02 | Stop reason: HOSPADM

## 2025-04-22 RX ORDER — NITROGLYCERIN 0.4 MG/1
0.4 TABLET SUBLINGUAL
Qty: 25 TABLET | Refills: 2 | Status: SHIPPED | OUTPATIENT
Start: 2025-04-22

## 2025-04-22 RX ORDER — ASPIRIN 81 MG/1
81 TABLET ORAL DAILY
Qty: 30 TABLET | Refills: 11 | Status: SHIPPED | OUTPATIENT
Start: 2025-04-23

## 2025-04-22 RX ORDER — CARVEDILOL 3.12 MG/1
3.12 TABLET ORAL 2 TIMES DAILY WITH MEALS
Qty: 60 TABLET | Refills: 5 | Status: SHIPPED | OUTPATIENT
Start: 2025-04-23

## 2025-04-22 RX ORDER — CARVEDILOL 3.12 MG/1
3.12 TABLET ORAL 2 TIMES DAILY WITH MEALS
Status: DISCONTINUED | OUTPATIENT
Start: 2025-04-22 | End: 2025-04-22

## 2025-04-22 RX ORDER — LOSARTAN POTASSIUM 25 MG/1
25 TABLET ORAL DAILY
Status: DISCONTINUED | OUTPATIENT
Start: 2025-04-23 | End: 2025-04-22

## 2025-04-22 RX ORDER — ATORVASTATIN CALCIUM 40 MG/1
40 TABLET, FILM COATED ORAL NIGHTLY
Qty: 30 TABLET | Refills: 11 | Status: SHIPPED | OUTPATIENT
Start: 2025-04-22

## 2025-04-22 RX ORDER — CARVEDILOL 3.12 MG/1
3.12 TABLET ORAL 2 TIMES DAILY WITH MEALS
Status: DISCONTINUED | OUTPATIENT
Start: 2025-04-22 | End: 2025-04-22 | Stop reason: HOSPADM

## 2025-04-22 RX ORDER — LOSARTAN POTASSIUM 50 MG/1
50 TABLET ORAL DAILY
Status: DISCONTINUED | OUTPATIENT
Start: 2025-04-23 | End: 2025-04-22 | Stop reason: HOSPADM

## 2025-04-22 RX ADMIN — ASPIRIN 81 MG: 81 TABLET, COATED ORAL at 08:48

## 2025-04-22 RX ADMIN — TICAGRELOR 90 MG: 90 TABLET ORAL at 08:48

## 2025-04-22 RX ADMIN — FAMOTIDINE 40 MG: 20 TABLET, FILM COATED ORAL at 08:47

## 2025-04-22 RX ADMIN — DULOXETINE 60 MG: 30 CAPSULE, DELAYED RELEASE ORAL at 08:48

## 2025-04-22 RX ADMIN — CARVEDILOL 3.12 MG: 3.12 TABLET, FILM COATED ORAL at 13:02

## 2025-04-22 RX ADMIN — LEVOTHYROXINE SODIUM 50 MCG: 50 TABLET ORAL at 08:49

## 2025-04-22 NOTE — CASE MANAGEMENT/SOCIAL WORK
Continued Stay Note   [facility]     Patient Name: Joseph F Seipel  MRN: [id number]  Today's Date: 4/22/2025    Admit Date: 4/20/2025    Plan: DC Plan: Anticipate routine home with spouse Raya.   Discharge Plan       Row Name 04/22/25 1600       Plan    Plan DC Plan: Anticipate routine home with spouse Raya.    Patient/Family in Agreement with Plan yes    Provided Post Acute Provider List? N/A    Provided Post Acute Provider Quality & Resource List? N/A    Plan Comments CM reviewed chart documentation for clinical updates. Nephrology note reviewed and they are clearing patient for discharge from their perspective. Awaiting Dr. Centeno to round. CM anticipating DC this afternoon. Brilinta price check per [facility] reports cost of Brilinta would be $246 per month with commercial insurance coupon card. CM notified treatment team of cost.  CM will continue to follow for any further needs. DC Barriers: Cardiac monitoring.                 Expected Discharge Date and Time       Expected Discharge Date Expected Discharge Time    Apr 22, 2025               Karen Allan RN     Office Phone: [phone]  Office Cell:     [phone]

## 2025-04-22 NOTE — PROGRESS NOTES
"NEPHROLOGY PROGRESS NOTE------[facility]    [facility]  [phone]  Donell Mary MD      Patient Care Team:  Rakel Lan MD as PCP - General  Cosmo Her MD as Consulting Physician (Nephrology)      Provider:  Donell Mary MD  Patient Name: Joseph F Seipel  :  1960    SUBJECTIVE:  Follow-up CKD  No chest pain or shortness of breath    Medication:  aspirin, 81 mg, Oral, Daily  atorvastatin, 40 mg, Oral, Nightly  carvedilol, 3.125 mg, Oral, BID With Meals  DULoxetine, 60 mg, Oral, Daily  famotidine, 40 mg, Oral, Daily  levothyroxine, 50 mcg, Oral, Daily  [Held by provider] losartan, 100 mg, Oral, Daily  ticagrelor, 90 mg, Oral, BID      nitroglycerin, 5-200 mcg/min, Last Rate: 5 mcg/min (25 0123)  phenylephrine, 0.5-3 mcg/kg/min, Last Rate: Stopped (25 0315)        OBJECTIVE    Vital Sign Min/Max for last 24 hours  Temp  Min: 97.7 °F (36.5 °C)  Max: 98.3 °F (36.8 °C)   BP  Min: 102/71  Max: 134/94   Pulse  Min: 50  Max: 62   Resp  Min: 13  Max: 18   SpO2  Min: 93 %  Max: 100 %   No data recorded   No data recorded     Flowsheet Rows      Flowsheet Row First Filed Value   Admission Height 182.9 cm (72\") Documented at 2025   Admission Weight 89.5 kg (197 lb 5 oz) Documented at 2025            No intake/output data recorded.  I/O last 3 completed shifts:  In: 3074 [P.O.:1660; I.V.:1414]  Out:  [Urine:]    Physical Exam:  General Appearance: alert, appears stated age and cooperative  Head: normocephalic, without obvious abnormality and atraumatic  Eyes: conjunctivae and sclerae normal and no icterus  Neck: supple and no JVD  Lungs: clear to auscultation and respirations regular  Heart: regular rhythm & normal rate and normal S1, S2  Chest: Wall no abnormalities observed  Abdomen: normal bowel sounds and soft, nontender  Extremities: moves extremities well, no edema, no cyanosis and no " "redness  Skin: no bleeding, bruising or rash, turgor normal, color normal and no lesions noted  Neurologic: Alert, and oriented. No focal deficits    Labs:    WBC WBC   Date Value Ref Range Status   04/22/2025 6.81 3.40 - 10.80 10*3/mm3 Final   04/21/2025 8.27 3.40 - 10.80 10*3/mm3 Final   04/20/2025 8.65 3.40 - 10.80 10*3/mm3 Final      HGB Hemoglobin   Date Value Ref Range Status   04/22/2025 13.2 13.0 - 17.7 g/dL Final   04/21/2025 12.4 (L) 13.0 - 17.7 g/dL Final   04/20/2025 13.4 13.0 - 17.7 g/dL Final      HCT Hematocrit   Date Value Ref Range Status   04/22/2025 40.5 37.5 - 51.0 % Final   04/21/2025 36.9 (L) 37.5 - 51.0 % Final   04/20/2025 40.5 37.5 - 51.0 % Final      Platelets No results found for: \"LABPLAT\"   MCV MCV   Date Value Ref Range Status   04/22/2025 96.0 79.0 - 97.0 fL Final   04/21/2025 92.5 79.0 - 97.0 fL Final   04/20/2025 93.1 79.0 - 97.0 fL Final          Sodium Sodium   Date Value Ref Range Status   04/21/2025 139 136 - 145 mmol/L Final   04/20/2025 140 136 - 145 mmol/L Final      Potassium Potassium   Date Value Ref Range Status   04/21/2025 3.8 3.5 - 5.2 mmol/L Final   04/20/2025 4.0 3.5 - 5.2 mmol/L Final      Chloride Chloride   Date Value Ref Range Status   04/21/2025 108 (H) 98 - 107 mmol/L Final   04/20/2025 105 98 - 107 mmol/L Final      CO2 CO2   Date Value Ref Range Status   04/21/2025 21.2 (L) 22.0 - 29.0 mmol/L Final   04/20/2025 24.5 22.0 - 29.0 mmol/L Final      BUN BUN   Date Value Ref Range Status   04/21/2025 19 8 - 23 mg/dL Final   04/20/2025 24 (H) 8 - 23 mg/dL Final      Creatinine Creatinine   Date Value Ref Range Status   04/21/2025 1.24 0.76 - 1.27 mg/dL Final   04/20/2025 1.47 (H) 0.76 - 1.27 mg/dL Final      Calcium Calcium   Date Value Ref Range Status   04/21/2025 8.9 8.6 - 10.5 mg/dL Final   04/20/2025 9.4 8.6 - 10.5 mg/dL Final      PO4 No components found for: \"PO4\"   Albumin Albumin   Date Value Ref Range Status   04/20/2025 4.4 3.5 - 5.2 g/dL Final    " "  Magnesium No results found for: \"MG\"   Uric Acid No components found for: \"URIC ACID\"     Imaging Results (Last 72 Hours)       Procedure Component Value Units Date/Time    XR Chest 1 View [[id number]] Collected: 04/21/25 0013     Updated: 04/21/25 0015    Narrative:      XR CHEST 1 VW    Date of Exam: 4/20/2025 11:00 PM EDT    Indication: chest pressure    Comparison: None available.    Findings:  There are no airspace consolidations. No pleural fluid. No pneumothorax. The pulmonary vasculature appears within normal limits. The cardiac and mediastinal silhouette appear unremarkable. No acute osseous abnormality identified.      Impression:      Impression:  No active disease.          Electronically Signed: Jose Tang MD    4/21/2025 12:13 AM EDT    Workstation ID: [id number]            Results for orders placed during the hospital encounter of 04/20/25    XR Chest 1 View    Narrative  XR CHEST 1 VW    Date of Exam: 4/20/2025 11:00 PM EDT    Indication: chest pressure    Comparison: None available.    Findings:  There are no airspace consolidations. No pleural fluid. No pneumothorax. The pulmonary vasculature appears within normal limits. The cardiac and mediastinal silhouette appear unremarkable. No acute osseous abnormality identified.    Impression  Impression:  No active disease.          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  Creatinine close to baseline  Hypertension  Hyperlipidemia  Acute MI-status post heart cath and PCI     Creatinine stable post heart cath/PCI  Blood pressure up some, resume losartan but at lower dose of 25 mg p.o. daily  Will follow-up in office with Dr. Her in 2 to 3 weeks        Donell Mary MD  [facility] " [facility]  [phone]  04/22/25  11:35 EDT

## 2025-04-23 NOTE — OUTREACH NOTE
Prep Survey      Flowsheet Row Responses   Denominational facility patient discharged from? [facility]   Is LACE score < 7 ? Yes   Eligibility Readm Mgmt   Discharge diagnosis NSTEMI   Does the patient have one of the following disease processes/diagnoses(primary or secondary)? Acute MI (STEMI,NSTEMI)   Does the patient have Home health ordered? No   Is there a DME ordered? No   Prep survey completed? Yes            ISELA MANE - Registered Nurse

## 2025-04-23 NOTE — CASE MANAGEMENT/SOCIAL WORK
Case Management Discharge Note      Final Note: Will return for Heart cath with PCI in the next week or so.        Transportation Services  Private: Car (with spouse)    Final Discharge Disposition Code: 81 - home or self care w/planned readmission    Karen Allan RN    Office Phone: [phone]  Office Cell:     [phone]

## 2025-05-01 ENCOUNTER — HOSPITAL ENCOUNTER (OUTPATIENT)
Facility: HOSPITAL | Age: 65
Discharge: HOME | End: 2025-05-02
Attending: INTERNAL MEDICINE | Admitting: INTERNAL MEDICINE
Payer: COMMERCIAL

## 2025-05-01 DIAGNOSIS — I25.10 CAD (CORONARY ARTERY DISEASE): ICD-10-CM

## 2025-05-01 LAB
ACT BLD: 141 SECONDS (ref 89–137)
ACT BLD: 158 SECONDS (ref 89–137)
ACT BLD: 170 SECONDS (ref 89–137)
ACT BLD: 233 SECONDS (ref 89–137)
ANION GAP SERPL CALCULATED.3IONS-SCNC: 12.1 MMOL/L (ref 5–15)
BUN SERPL-MCNC: 22 MG/DL (ref 8–23)
BUN/CREAT SERPL: 16.5 (ref 7–25)
CALCIUM SPEC-SCNC: 9.7 MG/DL (ref 8.6–10.5)
CHLORIDE SERPL-SCNC: 105 MMOL/L (ref 98–107)
CO2 SERPL-SCNC: 23.9 MMOL/L (ref 22–29)
CREAT SERPL-MCNC: 1.33 MG/DL (ref 0.76–1.27)
DEPRECATED RDW RBC AUTO: 47.2 FL (ref 37–54)
EGFRCR SERPLBLD CKD-EPI 2021: 58.6 ML/MIN/1.73
ERYTHROCYTE [DISTWIDTH] IN BLOOD BY AUTOMATED COUNT: 13.7 % (ref 12.3–15.4)
GLUCOSE SERPL-MCNC: 94 MG/DL (ref 65–99)
HCT VFR BLD AUTO: 45.8 % (ref 37.5–51)
HGB BLD-MCNC: 15.2 G/DL (ref 13–17.7)
MCH RBC QN AUTO: 31 PG (ref 26.6–33)
MCHC RBC AUTO-ENTMCNC: 33.2 G/DL (ref 31.5–35.7)
MCV RBC AUTO: 93.3 FL (ref 79–97)
PLATELET # BLD AUTO: 213 10*3/MM3 (ref 140–450)
PMV BLD AUTO: 10.2 FL (ref 6–12)
POTASSIUM SERPL-SCNC: 4.1 MMOL/L (ref 3.5–5.2)
RBC # BLD AUTO: 4.91 10*6/MM3 (ref 4.14–5.8)
SODIUM SERPL-SCNC: 141 MMOL/L (ref 136–145)
WBC NRBC COR # BLD AUTO: 5.25 10*3/MM3 (ref 3.4–10.8)

## 2025-05-01 PROCEDURE — 25810000003 SODIUM CHLORIDE 0.9 % SOLUTION: Performed by: INTERNAL MEDICINE

## 2025-05-01 PROCEDURE — 99152 MOD SED SAME PHYS/QHP 5/>YRS: CPT | Performed by: INTERNAL MEDICINE

## 2025-05-01 PROCEDURE — 85347 COAGULATION TIME ACTIVATED: CPT

## 2025-05-01 PROCEDURE — 25010000002 ATROPINE SULFATE 0.1 MG/ML: Performed by: INTERNAL MEDICINE

## 2025-05-01 PROCEDURE — C1894 INTRO/SHEATH, NON-LASER: HCPCS | Performed by: INTERNAL MEDICINE

## 2025-05-01 PROCEDURE — 25510000001 IOPAMIDOL PER 1 ML: Performed by: INTERNAL MEDICINE

## 2025-05-01 PROCEDURE — 92928 PRQ TCAT PLMT NTRAC ST 1 LES: CPT | Performed by: INTERNAL MEDICINE

## 2025-05-01 PROCEDURE — C1725 CATH, TRANSLUMIN NON-LASER: HCPCS | Performed by: INTERNAL MEDICINE

## 2025-05-01 PROCEDURE — C9600 PERC DRUG-EL COR STENT SING: HCPCS | Performed by: INTERNAL MEDICINE

## 2025-05-01 PROCEDURE — 85027 COMPLETE CBC AUTOMATED: CPT | Performed by: INTERNAL MEDICINE

## 2025-05-01 PROCEDURE — C1769 GUIDE WIRE: HCPCS | Performed by: INTERNAL MEDICINE

## 2025-05-01 PROCEDURE — C1887 CATHETER, GUIDING: HCPCS | Performed by: INTERNAL MEDICINE

## 2025-05-01 PROCEDURE — 25010000002 MIDAZOLAM PER 1 MG: Performed by: INTERNAL MEDICINE

## 2025-05-01 PROCEDURE — 25010000002 LIDOCAINE 1 % SOLUTION: Performed by: INTERNAL MEDICINE

## 2025-05-01 PROCEDURE — G0378 HOSPITAL OBSERVATION PER HR: HCPCS

## 2025-05-01 PROCEDURE — 80048 BASIC METABOLIC PNL TOTAL CA: CPT | Performed by: INTERNAL MEDICINE

## 2025-05-01 PROCEDURE — 25010000002 NITROGLYCERIN 5 MG/ML SOLUTION: Performed by: INTERNAL MEDICINE

## 2025-05-01 PROCEDURE — 93005 ELECTROCARDIOGRAM TRACING: CPT | Performed by: INTERNAL MEDICINE

## 2025-05-01 PROCEDURE — 25010000002 FENTANYL CITRATE (PF) 100 MCG/2ML SOLUTION: Performed by: INTERNAL MEDICINE

## 2025-05-01 PROCEDURE — 25010000002 HEPARIN (PORCINE) PER 1000 UNITS: Performed by: INTERNAL MEDICINE

## 2025-05-01 PROCEDURE — C1874 STENT, COATED/COV W/DEL SYS: HCPCS | Performed by: INTERNAL MEDICINE

## 2025-05-01 PROCEDURE — 99153 MOD SED SAME PHYS/QHP EA: CPT | Performed by: INTERNAL MEDICINE

## 2025-05-01 DEVICE — XIENCE SKYPOINT™ EVEROLIMUS ELUTING CORONARY STENT SYSTEM 2.25 MM X 23 MM / RAPID-EXCHANGE
Type: IMPLANTABLE DEVICE | Site: CORONARY | Status: FUNCTIONAL
Brand: XIENCE SKYPOINT™

## 2025-05-01 RX ORDER — FAMOTIDINE 20 MG/1
40 TABLET, FILM COATED ORAL DAILY
Status: DISCONTINUED | OUTPATIENT
Start: 2025-05-02 | End: 2025-05-02 | Stop reason: HOSPADM

## 2025-05-01 RX ORDER — MIDAZOLAM HYDROCHLORIDE 1 MG/ML
INJECTION, SOLUTION INTRAMUSCULAR; INTRAVENOUS
Status: DISCONTINUED | OUTPATIENT
Start: 2025-05-01 | End: 2025-05-01 | Stop reason: HOSPADM

## 2025-05-01 RX ORDER — ATORVASTATIN CALCIUM 40 MG/1
40 TABLET, FILM COATED ORAL NIGHTLY
Status: DISCONTINUED | OUTPATIENT
Start: 2025-05-01 | End: 2025-05-02 | Stop reason: HOSPADM

## 2025-05-01 RX ORDER — IOPAMIDOL 755 MG/ML
INJECTION, SOLUTION INTRAVASCULAR
Status: DISCONTINUED | OUTPATIENT
Start: 2025-05-01 | End: 2025-05-01 | Stop reason: HOSPADM

## 2025-05-01 RX ORDER — FINASTERIDE 5 MG/1
5 TABLET, FILM COATED ORAL DAILY
Status: DISCONTINUED | OUTPATIENT
Start: 2025-05-01 | End: 2025-05-02 | Stop reason: HOSPADM

## 2025-05-01 RX ORDER — SODIUM CHLORIDE 9 MG/ML
3 INJECTION, SOLUTION INTRAVENOUS CONTINUOUS
Status: DISPENSED | OUTPATIENT
Start: 2025-05-01 | End: 2025-05-01

## 2025-05-01 RX ORDER — ONDANSETRON 4 MG/1
4 TABLET, ORALLY DISINTEGRATING ORAL EVERY 6 HOURS PRN
Status: DISCONTINUED | OUTPATIENT
Start: 2025-05-01 | End: 2025-05-02 | Stop reason: HOSPADM

## 2025-05-01 RX ORDER — SODIUM CHLORIDE 9 MG/ML
250 INJECTION, SOLUTION INTRAVENOUS ONCE AS NEEDED
Status: COMPLETED | OUTPATIENT
Start: 2025-05-01 | End: 2025-05-01

## 2025-05-01 RX ORDER — LOSARTAN POTASSIUM 50 MG/1
50 TABLET ORAL DAILY
Status: DISCONTINUED | OUTPATIENT
Start: 2025-05-01 | End: 2025-05-02

## 2025-05-01 RX ORDER — FENTANYL CITRATE 50 UG/ML
INJECTION, SOLUTION INTRAMUSCULAR; INTRAVENOUS
Status: DISCONTINUED | OUTPATIENT
Start: 2025-05-01 | End: 2025-05-01 | Stop reason: HOSPADM

## 2025-05-01 RX ORDER — LIDOCAINE HYDROCHLORIDE 10 MG/ML
INJECTION, SOLUTION INFILTRATION; PERINEURAL
Status: DISCONTINUED | OUTPATIENT
Start: 2025-05-01 | End: 2025-05-01 | Stop reason: HOSPADM

## 2025-05-01 RX ORDER — ASPIRIN 81 MG/1
81 TABLET ORAL DAILY
Status: DISCONTINUED | OUTPATIENT
Start: 2025-05-01 | End: 2025-05-02 | Stop reason: HOSPADM

## 2025-05-01 RX ORDER — NITROGLYCERIN 5 MG/ML
INJECTION, SOLUTION INTRAVENOUS
Status: DISCONTINUED | OUTPATIENT
Start: 2025-05-01 | End: 2025-05-01 | Stop reason: HOSPADM

## 2025-05-01 RX ORDER — HEPARIN SODIUM 1000 [USP'U]/ML
INJECTION, SOLUTION INTRAVENOUS; SUBCUTANEOUS
Status: DISCONTINUED | OUTPATIENT
Start: 2025-05-01 | End: 2025-05-01 | Stop reason: HOSPADM

## 2025-05-01 RX ORDER — DULOXETIN HYDROCHLORIDE 30 MG/1
60 CAPSULE, DELAYED RELEASE ORAL DAILY
Status: DISCONTINUED | OUTPATIENT
Start: 2025-05-01 | End: 2025-05-02 | Stop reason: HOSPADM

## 2025-05-01 RX ORDER — ASPIRIN 81 MG/1
81 TABLET ORAL DAILY
Status: DISCONTINUED | OUTPATIENT
Start: 2025-05-01 | End: 2025-05-01 | Stop reason: SDUPTHER

## 2025-05-01 RX ORDER — DIPHENHYDRAMINE HCL 25 MG
25 CAPSULE ORAL EVERY 6 HOURS PRN
Status: DISCONTINUED | OUTPATIENT
Start: 2025-05-01 | End: 2025-05-02 | Stop reason: HOSPADM

## 2025-05-01 RX ORDER — SODIUM CHLORIDE 9 MG/ML
30 INJECTION, SOLUTION INTRAVENOUS CONTINUOUS
Status: DISPENSED | OUTPATIENT
Start: 2025-05-01 | End: 2025-05-02

## 2025-05-01 RX ORDER — ONDANSETRON 2 MG/ML
4 INJECTION INTRAMUSCULAR; INTRAVENOUS EVERY 6 HOURS PRN
Status: DISCONTINUED | OUTPATIENT
Start: 2025-05-01 | End: 2025-05-02 | Stop reason: HOSPADM

## 2025-05-01 RX ORDER — LEVOTHYROXINE SODIUM 50 UG/1
50 TABLET ORAL
Status: DISCONTINUED | OUTPATIENT
Start: 2025-05-02 | End: 2025-05-02 | Stop reason: HOSPADM

## 2025-05-01 RX ORDER — NITROGLYCERIN 0.4 MG/1
0.4 TABLET SUBLINGUAL
Status: DISCONTINUED | OUTPATIENT
Start: 2025-05-01 | End: 2025-05-02 | Stop reason: HOSPADM

## 2025-05-01 RX ORDER — ACETAMINOPHEN 325 MG/1
650 TABLET ORAL EVERY 4 HOURS PRN
Status: DISCONTINUED | OUTPATIENT
Start: 2025-05-01 | End: 2025-05-02 | Stop reason: HOSPADM

## 2025-05-01 RX ORDER — NITROGLYCERIN 0.4 MG/1
0.4 TABLET SUBLINGUAL
Status: DISCONTINUED | OUTPATIENT
Start: 2025-05-01 | End: 2025-05-01 | Stop reason: SDUPTHER

## 2025-05-01 RX ORDER — NICOTINE 21 MG/24HR
1 PATCH, TRANSDERMAL 24 HOURS TRANSDERMAL DAILY PRN
Status: DISCONTINUED | OUTPATIENT
Start: 2025-05-01 | End: 2025-05-02 | Stop reason: HOSPADM

## 2025-05-01 RX ORDER — ALUMINA, MAGNESIA, AND SIMETHICONE 2400; 2400; 240 MG/30ML; MG/30ML; MG/30ML
15 SUSPENSION ORAL EVERY 6 HOURS PRN
Status: DISCONTINUED | OUTPATIENT
Start: 2025-05-01 | End: 2025-05-02 | Stop reason: HOSPADM

## 2025-05-01 RX ORDER — CARVEDILOL 3.12 MG/1
3.12 TABLET ORAL 2 TIMES DAILY WITH MEALS
Status: DISCONTINUED | OUTPATIENT
Start: 2025-05-01 | End: 2025-05-02 | Stop reason: HOSPADM

## 2025-05-01 RX ADMIN — ATORVASTATIN CALCIUM 40 MG: 40 TABLET, FILM COATED ORAL at 22:03

## 2025-05-01 RX ADMIN — DULOXETINE 60 MG: 30 CAPSULE, DELAYED RELEASE ORAL at 22:03

## 2025-05-01 RX ADMIN — SODIUM CHLORIDE 250 ML: 9 INJECTION, SOLUTION INTRAVENOUS at 19:53

## 2025-05-01 RX ADMIN — LOSARTAN POTASSIUM 50 MG: 50 TABLET, FILM COATED ORAL at 22:03

## 2025-05-01 RX ADMIN — FINASTERIDE 5 MG: 5 TABLET, FILM COATED ORAL at 22:03

## 2025-05-01 RX ADMIN — ACETAMINOPHEN 650 MG: 325 TABLET, FILM COATED ORAL at 22:03

## 2025-05-01 RX ADMIN — TICAGRELOR 90 MG: 90 TABLET ORAL at 22:03

## 2025-05-01 RX ADMIN — ATROPINE SULFATE 1 MG: 0.1 INJECTION INTRAVENOUS at 19:53

## 2025-05-01 RX ADMIN — SODIUM CHLORIDE 3 ML/KG/HR: 9 INJECTION, SOLUTION INTRAVENOUS at 17:21

## 2025-05-01 RX ADMIN — ASPIRIN 81 MG: 81 TABLET, COATED ORAL at 22:03

## 2025-05-01 RX ADMIN — CARVEDILOL 3.12 MG: 3.12 TABLET, FILM COATED ORAL at 22:03

## 2025-05-01 NOTE — Clinical Note
Suture was used to secure the sheath post procedure. Sheath Left Intact after the procedure.  Pressure Bag was used to stabalize the sheath post procedure.

## 2025-05-01 NOTE — PLAN OF CARE
Goal Outcome Evaluation:                      Problem: Adult Inpatient Plan of Care  Goal: Plan of Care Review  Outcome: Progressing  Goal: Patient-Specific Goal (Individualized)  Outcome: Progressing  Goal: Absence of Hospital-Acquired Illness or Injury  Outcome: Progressing  Goal: Optimal Comfort and Wellbeing  Outcome: Progressing  Goal: Readiness for Transition of Care  Outcome: Progressing  Intervention: Mutually Develop Transition Plan  Recent Flowsheet Documentation  Taken 5/1/2025 1757 by Echo Marie, RN  Equipment Currently Used at Home: none  Transportation Anticipated: family or friend will provide  Patient/Family Anticipated Services at Transition: none  Patient/Family Anticipates Transition to: home with family

## 2025-05-01 NOTE — Clinical Note
Second inflation of balloon - Max pressure = 16 antonio. 2nd Inflation of balloon - Duration = 8 seconds.

## 2025-05-02 ENCOUNTER — READMISSION MANAGEMENT (OUTPATIENT)
Dept: CALL CENTER | Facility: HOSPITAL | Age: 65
End: 2025-05-02
Payer: COMMERCIAL

## 2025-05-02 VITALS
RESPIRATION RATE: 15 BRPM | SYSTOLIC BLOOD PRESSURE: 125 MMHG | HEIGHT: 72 IN | BODY MASS INDEX: 25.74 KG/M2 | HEART RATE: 70 BPM | TEMPERATURE: 97.8 F | WEIGHT: 190.04 LBS | OXYGEN SATURATION: 95 % | DIASTOLIC BLOOD PRESSURE: 86 MMHG

## 2025-05-02 LAB
ANION GAP SERPL CALCULATED.3IONS-SCNC: 11.9 MMOL/L (ref 5–15)
BUN SERPL-MCNC: 20 MG/DL (ref 8–23)
BUN/CREAT SERPL: 14.7 (ref 7–25)
CALCIUM SPEC-SCNC: 9.4 MG/DL (ref 8.6–10.5)
CHLORIDE SERPL-SCNC: 104 MMOL/L (ref 98–107)
CO2 SERPL-SCNC: 22.1 MMOL/L (ref 22–29)
CREAT SERPL-MCNC: 1.36 MG/DL (ref 0.76–1.27)
DEPRECATED RDW RBC AUTO: 46.5 FL (ref 37–54)
EGFRCR SERPLBLD CKD-EPI 2021: 57 ML/MIN/1.73
ERYTHROCYTE [DISTWIDTH] IN BLOOD BY AUTOMATED COUNT: 13.6 % (ref 12.3–15.4)
GLUCOSE SERPL-MCNC: 161 MG/DL (ref 65–99)
HCT VFR BLD AUTO: 45.5 % (ref 37.5–51)
HGB BLD-MCNC: 15 G/DL (ref 13–17.7)
MCH RBC QN AUTO: 30.5 PG (ref 26.6–33)
MCHC RBC AUTO-ENTMCNC: 33 G/DL (ref 31.5–35.7)
MCV RBC AUTO: 92.5 FL (ref 79–97)
PLATELET # BLD AUTO: 198 10*3/MM3 (ref 140–450)
PMV BLD AUTO: 9.9 FL (ref 6–12)
POTASSIUM SERPL-SCNC: 3.8 MMOL/L (ref 3.5–5.2)
QT INTERVAL: 475 MS
QTC INTERVAL: 436 MS
RBC # BLD AUTO: 4.92 10*6/MM3 (ref 4.14–5.8)
SODIUM SERPL-SCNC: 138 MMOL/L (ref 136–145)
WBC NRBC COR # BLD AUTO: 7.39 10*3/MM3 (ref 3.4–10.8)

## 2025-05-02 PROCEDURE — 93010 ELECTROCARDIOGRAM REPORT: CPT | Performed by: INTERNAL MEDICINE

## 2025-05-02 PROCEDURE — 99238 HOSP IP/OBS DSCHRG MGMT 30/<: CPT | Performed by: INTERNAL MEDICINE

## 2025-05-02 PROCEDURE — 80048 BASIC METABOLIC PNL TOTAL CA: CPT | Performed by: INTERNAL MEDICINE

## 2025-05-02 PROCEDURE — 85027 COMPLETE CBC AUTOMATED: CPT | Performed by: INTERNAL MEDICINE

## 2025-05-02 RX ORDER — LOSARTAN POTASSIUM 50 MG/1
100 TABLET ORAL DAILY
Status: DISCONTINUED | OUTPATIENT
Start: 2025-05-03 | End: 2025-05-02 | Stop reason: HOSPADM

## 2025-05-02 RX ORDER — LOSARTAN POTASSIUM 100 MG/1
100 TABLET ORAL DAILY
COMMUNITY
Start: 2025-05-03

## 2025-05-02 RX ADMIN — LOSARTAN POTASSIUM 50 MG: 50 TABLET, FILM COATED ORAL at 08:08

## 2025-05-02 RX ADMIN — TICAGRELOR 90 MG: 90 TABLET ORAL at 08:08

## 2025-05-02 RX ADMIN — DULOXETINE 60 MG: 30 CAPSULE, DELAYED RELEASE ORAL at 08:09

## 2025-05-02 RX ADMIN — FINASTERIDE 5 MG: 5 TABLET, FILM COATED ORAL at 08:08

## 2025-05-02 RX ADMIN — CARVEDILOL 3.12 MG: 3.12 TABLET, FILM COATED ORAL at 08:08

## 2025-05-02 RX ADMIN — ASPIRIN 81 MG: 81 TABLET, COATED ORAL at 08:08

## 2025-05-02 RX ADMIN — FAMOTIDINE 40 MG: 20 TABLET, FILM COATED ORAL at 08:08

## 2025-05-02 RX ADMIN — LEVOTHYROXINE SODIUM 50 MCG: 50 TABLET ORAL at 05:32

## 2025-05-02 NOTE — CASE MANAGEMENT/SOCIAL WORK
Case Management Discharge Note      Final Note: routine home         Selected Continued Care - Discharged on 5/2/2025 Admission date: 5/1/2025 - Discharge disposition: Home or Self Care       Transportation Services  Private: Car    Final Discharge Disposition Code: 01 - home or self-care

## 2025-05-02 NOTE — CASE MANAGEMENT/SOCIAL WORK
Continued Stay Note  [facility]     Patient Name: Joseph F Seipel  MRN: [id number]  Today's Date: 5/2/2025    Admit Date: 5/1/2025        Discharge Plan       Row Name 05/02/25 1037       Plan    Plan Comments Patient listed outpatient s/p cardiac procedure. M2B already listed on chart.             Louise Miguel RN      [facility]  Office: [phone]  Cell: [phone]  Fax # [phone]

## 2025-05-02 NOTE — NURSING NOTE
Pt vagaled during sheath pull. HR never dropped below 60 but BP dropped to 81/52. Pt very symptomatic with sweating, nausea, and dizziness. 1mg atropine pushed and 250cc IVF bolus started. Pt recovered well, all symptoms resolved and BP improve to WNL.

## 2025-05-02 NOTE — PLAN OF CARE
Goal Outcome Evaluation:                    Problem: Adult Inpatient Plan of Care  Goal: Plan of Care Review  Outcome: Progressing  Goal: Patient-Specific Goal (Individualized)  Outcome: Progressing  Goal: Absence of Hospital-Acquired Illness or Injury  Outcome: Progressing  Intervention: Identify and Manage Fall Risk  Recent Flowsheet Documentation  Taken 5/2/2025 0200 by Alejandro Desai RN  Safety Promotion/Fall Prevention:   safety round/check completed   room organization consistent   nonskid shoes/slippers when out of bed   lighting adjusted   assistive device/personal items within reach   clutter free environment maintained   fall prevention program maintained  Taken 5/2/2025 0000 by Alejandro Desai RN  Safety Promotion/Fall Prevention:   safety round/check completed   room organization consistent   nonskid shoes/slippers when out of bed   lighting adjusted   assistive device/personal items within reach   clutter free environment maintained   fall prevention program maintained  Taken 5/1/2025 2200 by Alejandro Desai RN  Safety Promotion/Fall Prevention:   safety round/check completed   room organization consistent   nonskid shoes/slippers when out of bed   lighting adjusted   assistive device/personal items within reach   clutter free environment maintained   fall prevention program maintained  Taken 5/1/2025 2000 by Alejandro Desai RN  Safety Promotion/Fall Prevention:   safety round/check completed   room organization consistent   nonskid shoes/slippers when out of bed   lighting adjusted   assistive device/personal items within reach   clutter free environment maintained   fall prevention program maintained  Intervention: Prevent Skin Injury  Recent Flowsheet Documentation  Taken 5/2/2025 0200 by Alejandro Desai RN  Body Position: position changed independently  Taken 5/2/2025 0100 by Alejandro Desai RN  Body Position: supine  Taken 5/2/2025 0000 by Alejandro Desai RN  Body Position: supine  Skin  Protection: incontinence pads utilized  Taken 5/1/2025 2000 by Alejandro Desai RN  Body Position: supine  Skin Protection: incontinence pads utilized  Intervention: Prevent Infection  Recent Flowsheet Documentation  Taken 5/2/2025 0200 by Alejandro Desai RN  Infection Prevention:   equipment surfaces disinfected   hand hygiene promoted   personal protective equipment utilized   rest/sleep promoted   single patient room provided  Taken 5/2/2025 0000 by Alejandro Desai RN  Infection Prevention:   equipment surfaces disinfected   hand hygiene promoted   personal protective equipment utilized   rest/sleep promoted   single patient room provided  Taken 5/1/2025 2200 by Alejandro Desai RN  Infection Prevention:   equipment surfaces disinfected   hand hygiene promoted   personal protective equipment utilized   rest/sleep promoted   single patient room provided  Taken 5/1/2025 2000 by Alejandro Desai RN  Infection Prevention:   equipment surfaces disinfected   hand hygiene promoted   personal protective equipment utilized   rest/sleep promoted   single patient room provided  Goal: Optimal Comfort and Wellbeing  Outcome: Progressing  Intervention: Monitor Pain and Promote Comfort  Recent Flowsheet Documentation  Taken 5/1/2025 2233 by Alejandro Desai RN  Pain Management Interventions:   quiet environment facilitated   position adjusted   pillow support provided   pain management plan reviewed with patient/caregiver   care clustered  Taken 5/1/2025 2203 by Alejandro Desai RN  Pain Management Interventions:   quiet environment facilitated   pain medication given   pain management plan reviewed with patient/caregiver   pillow support provided   position adjusted   care clustered  Goal: Readiness for Transition of Care  Outcome: Progressing

## 2025-05-02 NOTE — OUTREACH NOTE
AMI Week 2 Survey      Flowsheet Row Responses   Confucianist facility patient discharged from? [facility]   Does the patient have one of the following disease processes/diagnoses(primary or secondary)? Acute MI (STEMI,NSTEMI)   Week 2 attempt successful? No   Call start time 0139   Unsuccessful attempts Attempt 1   Revoke Readmitted            NEGRO FUNG - Registered Nurse

## 2025-05-06 LAB
QT INTERVAL: 475 MS
QTC INTERVAL: 436 MS

## 2025-05-08 ENCOUNTER — OFFICE VISIT (OUTPATIENT)
Dept: CARDIOLOGY | Facility: CLINIC | Age: 65
End: 2025-05-08
Payer: COMMERCIAL

## 2025-05-08 VITALS
WEIGHT: 192 LBS | SYSTOLIC BLOOD PRESSURE: 126 MMHG | HEART RATE: 71 BPM | BODY MASS INDEX: 26.01 KG/M2 | HEIGHT: 72 IN | OXYGEN SATURATION: 98 % | DIASTOLIC BLOOD PRESSURE: 87 MMHG

## 2025-05-08 DIAGNOSIS — Z98.61 CAD S/P PERCUTANEOUS CORONARY ANGIOPLASTY: ICD-10-CM

## 2025-05-08 DIAGNOSIS — I21.4 NSTEMI (NON-ST ELEVATED MYOCARDIAL INFARCTION): Primary | ICD-10-CM

## 2025-05-08 DIAGNOSIS — I21.4 ACUTE NON-ST ELEVATION MYOCARDIAL INFARCTION (NSTEMI): ICD-10-CM

## 2025-05-08 DIAGNOSIS — E78.5 HYPERLIPIDEMIA LDL GOAL <100: ICD-10-CM

## 2025-05-08 DIAGNOSIS — I10 ESSENTIAL (PRIMARY) HYPERTENSION: ICD-10-CM

## 2025-05-08 DIAGNOSIS — I25.10 CAD S/P PERCUTANEOUS CORONARY ANGIOPLASTY: ICD-10-CM

## 2025-05-08 DIAGNOSIS — T50.8X5A ACUTE KIDNEY INJURY DUE TO CONTRAST AGENT: ICD-10-CM

## 2025-05-08 DIAGNOSIS — N99.0 ACUTE KIDNEY INJURY DUE TO CONTRAST AGENT: ICD-10-CM

## 2025-05-08 NOTE — PROGRESS NOTES
[facility] CARDIOLOGY      REASON FOR FOLLOW-UP:  Follow-up heart cath/PCI          Chief Complaint   Patient presents with    Heart Problem         Dear Rakel Lan MD        History of Present Illness   It was my pleasure to see Dr. Joseph Seipel in the office today.  He is a 65-year-old gentleman who presented to [facility] 4/20/20[facility] for symptoms of chest discomfort that had continued throughout the day.  In the ED, workup was initiated to include high-sensitivity troponins which were elevated.  He ruled in for acute non-ST elevation MI and was taken urgently to the cardiac Cath Lab where he was found to have significant coronary disease of the RCA with diffuse 60-70% midportion prior to bifurcation of the PDA/PLB, 100% proximal/ostial PDA that was successfully treated with complex PCI/DANIEL deployment with balloon angioplasty via simultaneous kissing balloons of the ostial PLB.  There was residual significant disease involving obtuse marginal branch of circumflex-too small for percutaneous revascularization.  Residual significant disease of the mid LAD at 95%, residual moderate left main at 30-40% distal.  The patient was started on dual antiplatelet therapy consisting of ticagrelor 90 mg twice daily and aspirin 81 mg daily.  Monitored closely.  2D echocardiogram performed showed normal LV systolic function with EF 56-60%, grade 1 DD, no significant valvular pathology.  He was discharged home and brought back to the Cath Lab on 5/1/2025 for staged PCI/DANIEL deployment to the LAD.  He was observed overnight.  Repeat BMP showed acute kidney injury with creatinine 1.33.  Losartan was increased to his prior dose of 100 mg daily.  He was discharged home the next day.  He presents today in follow-up from those procedures.    Today, the patient reports that he feels well, except for some tiredness.  He denies any complaints of chest pain, pressure, tightness or palpitations.  He  denies any shortness of breath at rest, dyspnea with exertion, lower extremity edema, dizziness or lightheadedness.  He presents a blood pressure log today with good readings.      ASSESSMENT:  Acute non-ST elevation MI type II status post PCI/RCA  Staged PCI-LAD  Hyperlipidemia  Primary hypertension  Acute kidney injury-likely contrast induced    PLAN:  Treadmill stress test for entrance to phase 2 cardiac rehab  Cardiac rehab order placed  Check BMP to reevaluate renal function  The patient is interested in genetic testing for possible familial hypercholesterolemia-we will be assisting him with that testing.  Continue current CV plan of care to include uninterrupted dual antiplatelet therapy with ticagrelor 90 mg twice daily, aspirin 81 mg daily.  Continue statin, BB, losartan.  Risk factor modification including heart healthy diet, routine exercise as tolerated, healthy weight.  Follow-up with primary cardiologist in 3 months or sooner if needed        CHF Guideline Directed Medical Therapy  Beta Blocker:   ARNI/ACE/ARB:   SGLT 2 inhibitors:   Diuretics:   Aldosterone Antagonist:   Vasodilators & Nitrates:       Diagnoses and all orders for this visit:    1. NSTEMI (non-ST elevated myocardial infarction) (Primary)  -     Basic Metabolic Panel; Future  -     Treadmill Stress Test; Future  -     Cardiac Rehab Phase II; Future    2. Acute non-ST elevation myocardial infarction (NSTEMI)  -     Basic Metabolic Panel; Future  -     Treadmill Stress Test; Future  -     Cardiac Rehab Phase II; Future    3. CAD S/P percutaneous coronary angioplasty  -     Basic Metabolic Panel; Future  -     Treadmill Stress Test; Future  -     Cardiac Rehab Phase II; Future    4. Acute kidney injury due to contrast agent  -     Basic Metabolic Panel; Future  -     Treadmill Stress Test; Future  -     Cardiac Rehab Phase II; Future    5. Hyperlipidemia LDL goal <100    6. Essential (primary) hypertension          The following portions of  the patient's history were reviewed and updated as appropriate: allergies, current medications, past family history, past medical history, past social history, past surgical history, and problem list.    REVIEW OF SYSTEMS:    Review of Systems   Constitutional: Positive for malaise/fatigue.   All other systems reviewed and are negative.      Vitals:    05/08/25 0808   BP: 126/87   Pulse: 71   SpO2: 98%         PHYSICAL EXAM:    General: Alert, cooperative, no distress, appears stated age  Head:  Normocephalic, atraumatic, mucous membranes moist  Eyes:  Conjunctiva/corneas clear, EOM's intact     Neck:  Supple,  no JVD or bruit     Lungs: Clear to auscultation bilaterally, no wheezes rhonchi rales are noted  Chest wall: No tenderness  Musculoskeletal:   Ambulates freely without assistance  Heart::  Regular rate and rhythm, S1 and S2 normal, no murmur, rub or gallop  Abdomen: Soft, non-tender, nondistended, bowel sounds active, no abdominal bruit  Extremities: No cyanosis, clubbing, or edema   Pulses: 2+ and symmetric all extremities  Skin:  No rashes or lesions  Neuro/psych: A&O x3. CN II through XII are grossly intact with appropriate affect        Past Medical History:   Diagnosis Date    BPH (benign prostatic hyperplasia)     Dysplasia of one kidney     GERD (gastroesophageal reflux disease)     Hyperlipidemia     Hypertension     IBS (irritable bowel syndrome)     Osteopenia     Sleep apnea     Vitamin D deficiency        Past Surgical History:   Procedure Laterality Date    APPENDECTOMY      CARDIAC CATHETERIZATION N/A 4/21/2025    Procedure: Left Heart Cath;  Surgeon: Elio Centeno DO;  Location: [facility] CATH INVASIVE LOCATION;  Service: Cardiovascular;  Laterality: N/A;    CARDIAC CATHETERIZATION N/A 5/1/2025    Procedure: Percutaneous Coronary Intervention;  Surgeon: Elio Centeno DO;  Location: [facility] CATH INVASIVE LOCATION;  Service: Cardiovascular;  Laterality: N/A;  LAD     CARDIAC CATHETERIZATION N/A 5/1/2025    Procedure: Stent DANIEL coronary;  Surgeon: Elio Centeno DO;  Location: [facility] CATH INVASIVE LOCATION;  Service: Cardiovascular;  Laterality: N/A;  LAD    THYROID SURGERY           Current Outpatient Medications:     allopurinol (ZYLOPRIM) 300 MG tablet, TAKE 1 TABLET BY MOUTH EVERY DAY, Disp: 90 tablet, Rfl: 3    aspirin 81 MG EC tablet, Take 1 tablet by mouth Daily., Disp: 30 tablet, Rfl: 11    atorvastatin (LIPITOR) 40 MG tablet, Take 1 tablet by mouth Every Night., Disp: 30 tablet, Rfl: 11    carvedilol (COREG) 3.125 MG tablet, Take 1 tablet by mouth 2 (Two) Times a Day With Meals., Disp: 60 tablet, Rfl: 5    DULoxetine (CYMBALTA) 60 MG capsule, TAKE 1 CAPSULE BY MOUTH EVERY DAY, Disp: 90 capsule, Rfl: 1    famotidine (PEPCID) 20 MG tablet, Take 2 tablets by mouth Daily., Disp: 180 tablet, Rfl: 3    finasteride (Proscar) 5 MG tablet, TAKE 1 TABLET BY MOUTH EVERY DAY, Disp: 90 tablet, Rfl: 0    levothyroxine (SYNTHROID, LEVOTHROID) 50 MCG tablet, Take 1 tablet by mouth Daily., Disp: 90 tablet, Rfl: 1    losartan (COZAAR) 100 MG tablet, Take 1 tablet by mouth Daily., Disp: , Rfl:     nitroglycerin (NITROSTAT) 0.4 MG SL tablet, Place 1 tablet under the tongue Every 5 (Five) Minutes As Needed for Chest Pain (Systolic BP Greater Than 100). Take no more than 3 doses in 15 minutes., Disp: 25 tablet, Rfl: 2    ticagrelor (BRILINTA) 90 MG tablet tablet, Take 1 tablet by mouth 2 (Two) Times a Day., Disp: 60 tablet, Rfl: 11    Allergies   Allergen Reactions    Clindamycin Hcl Rash       History reviewed. No pertinent family history.    Social History     Tobacco Use    Smoking status: Never     Passive exposure: Never    Smokeless tobacco: Never   Substance Use Topics    Alcohol use: Yes     Alcohol/week: 2.0 standard drinks of alcohol     Types: 2 Cans of beer per week     Comment: occassionally           Current Electrocardiogram:  Procedures        EMR  "Dragon/Transcription:   \"Dictated utilizing Dragon dictation\".     Copied text in this note has been reviewed by me and is accurate as of 05/08/25.    "

## 2025-05-14 ENCOUNTER — HOSPITAL ENCOUNTER (OUTPATIENT)
Dept: CARDIOLOGY | Facility: HOSPITAL | Age: 65
Discharge: HOME | End: 2025-05-14
Admitting: INTERNAL MEDICINE
Payer: COMMERCIAL

## 2025-05-14 DIAGNOSIS — Z98.61 CAD S/P PERCUTANEOUS CORONARY ANGIOPLASTY: ICD-10-CM

## 2025-05-14 DIAGNOSIS — T50.8X5A ACUTE KIDNEY INJURY DUE TO CONTRAST AGENT: ICD-10-CM

## 2025-05-14 DIAGNOSIS — N99.0 ACUTE KIDNEY INJURY DUE TO CONTRAST AGENT: ICD-10-CM

## 2025-05-14 DIAGNOSIS — I21.4 ACUTE NON-ST ELEVATION MYOCARDIAL INFARCTION (NSTEMI): ICD-10-CM

## 2025-05-14 DIAGNOSIS — I25.10 CAD S/P PERCUTANEOUS CORONARY ANGIOPLASTY: ICD-10-CM

## 2025-05-14 DIAGNOSIS — I21.4 NSTEMI (NON-ST ELEVATED MYOCARDIAL INFARCTION): ICD-10-CM

## 2025-05-14 LAB
BH CV STRESS BP STAGE 1: NORMAL
BH CV STRESS BP STAGE 2: NORMAL
BH CV STRESS DURATION MIN STAGE 1: 3
BH CV STRESS DURATION MIN STAGE 2: 3
BH CV STRESS DURATION MIN STAGE 3: 3
BH CV STRESS DURATION SEC STAGE 1: 0
BH CV STRESS DURATION SEC STAGE 2: 0
BH CV STRESS DURATION SEC STAGE 3: 0
BH CV STRESS GRADE STAGE 1: 10
BH CV STRESS GRADE STAGE 2: 12
BH CV STRESS GRADE STAGE 3: 14
BH CV STRESS HR STAGE 1: 120
BH CV STRESS HR STAGE 2: 142
BH CV STRESS HR STAGE 3: 162
BH CV STRESS METS STAGE 1: 5
BH CV STRESS METS STAGE 2: 7.5
BH CV STRESS METS STAGE 3: 10
BH CV STRESS PROTOCOL 1: NORMAL
BH CV STRESS RECOVERY BP: NORMAL MMHG
BH CV STRESS RECOVERY HR: 107 BPM
BH CV STRESS SPEED STAGE 1: 1.7
BH CV STRESS SPEED STAGE 2: 2.5
BH CV STRESS SPEED STAGE 3: 3.4
BH CV STRESS STAGE 1: 1
BH CV STRESS STAGE 2: 2
BH CV STRESS STAGE 3: 3
MAXIMAL PREDICTED HEART RATE: 153 BPM
STRESS BASELINE BP: NORMAL MMHG
STRESS BASELINE HR: 91 BPM
STRESS POST EXERCISE DUR MIN: 6 MIN
STRESS POST EXERCISE DUR SEC: 32 SEC
STRESS TARGET HR: 130 BPM

## 2025-05-14 PROCEDURE — 93017 CV STRESS TEST TRACING ONLY: CPT

## 2025-05-16 ENCOUNTER — LAB (OUTPATIENT)
Dept: LAB | Facility: HOSPITAL | Age: 65
End: 2025-05-16
Payer: COMMERCIAL

## 2025-05-16 ENCOUNTER — TRANSCRIBE ORDERS (OUTPATIENT)
Dept: ADMINISTRATIVE | Facility: HOSPITAL | Age: 65
End: 2025-05-16
Payer: COMMERCIAL

## 2025-05-16 DIAGNOSIS — E78.5 HYPERLIPIDEMIA, UNSPECIFIED HYPERLIPIDEMIA TYPE: ICD-10-CM

## 2025-05-16 DIAGNOSIS — E55.9 AVITAMINOSIS D: ICD-10-CM

## 2025-05-16 DIAGNOSIS — E03.9 ACQUIRED HYPOTHYROIDISM: ICD-10-CM

## 2025-05-16 DIAGNOSIS — R80.9 PROTEINURIA, UNSPECIFIED TYPE: ICD-10-CM

## 2025-05-16 DIAGNOSIS — N18.31 CHRONIC KIDNEY DISEASE (CKD) STAGE G3A/A1, MODERATELY DECREASED GLOMERULAR FILTRATION RATE (GFR) BETWEEN 45-59 ML/MIN/1.73 SQUARE METER AND ALBUMINURIA CREATININE RATIO LESS THAN 30 MG/G (CMS/H*: Primary | ICD-10-CM

## 2025-05-16 DIAGNOSIS — E03.0 CONGENITAL GOITER: ICD-10-CM

## 2025-05-16 DIAGNOSIS — N18.31 CHRONIC KIDNEY DISEASE (CKD) STAGE G3A/A1, MODERATELY DECREASED GLOMERULAR FILTRATION RATE (GFR) BETWEEN 45-59 ML/MIN/1.73 SQUARE METER AND ALBUMINURIA CREATININE RATIO LESS THAN 30 MG/G (CMS/H*: ICD-10-CM

## 2025-05-16 LAB
25(OH)D3 SERPL-MCNC: 49.7 NG/ML (ref 30–100)
ALBUMIN SERPL-MCNC: 4.6 G/DL (ref 3.5–5.2)
ALBUMIN/GLOB SERPL: 1.8 G/DL
ALP SERPL-CCNC: 82 U/L (ref 39–117)
ALT SERPL W P-5'-P-CCNC: 36 U/L (ref 1–41)
ANION GAP SERPL CALCULATED.3IONS-SCNC: 12 MMOL/L (ref 5–15)
AST SERPL-CCNC: 27 U/L (ref 1–40)
BASOPHILS # BLD AUTO: 0.04 10*3/MM3 (ref 0–0.2)
BASOPHILS NFR BLD AUTO: 0.8 % (ref 0–1.5)
BILIRUB SERPL-MCNC: 0.9 MG/DL (ref 0–1.2)
BILIRUB UR QL STRIP: NEGATIVE
BUN SERPL-MCNC: 18 MG/DL (ref 8–23)
BUN/CREAT SERPL: 10.7 (ref 7–25)
CALCIUM SPEC-SCNC: 9.9 MG/DL (ref 8.6–10.5)
CHLORIDE SERPL-SCNC: 104 MMOL/L (ref 98–107)
CK SERPL-CCNC: 84 U/L (ref 20–200)
CLARITY UR: CLEAR
CO2 SERPL-SCNC: 25 MMOL/L (ref 22–29)
COLOR UR: YELLOW
CREAT SERPL-MCNC: 1.69 MG/DL (ref 0.76–1.27)
CREAT UR-MCNC: 66.1 MG/DL
DEPRECATED RDW RBC AUTO: 47.5 FL (ref 37–54)
EGFRCR SERPLBLD CKD-EPI 2021: 43.9 ML/MIN/1.73
EOSINOPHIL # BLD AUTO: 0.11 10*3/MM3 (ref 0–0.4)
EOSINOPHIL NFR BLD AUTO: 2.3 % (ref 0.3–6.2)
ERYTHROCYTE [DISTWIDTH] IN BLOOD BY AUTOMATED COUNT: 13.4 % (ref 12.3–15.4)
GLOBULIN UR ELPH-MCNC: 2.6 GM/DL
GLUCOSE SERPL-MCNC: 99 MG/DL (ref 65–99)
GLUCOSE UR STRIP-MCNC: NEGATIVE MG/DL
HCT VFR BLD AUTO: 47.5 % (ref 37.5–51)
HGB BLD-MCNC: 15.3 G/DL (ref 13–17.7)
HGB UR QL STRIP.AUTO: NEGATIVE
HOLD SPECIMEN: NORMAL
IMM GRANULOCYTES # BLD AUTO: 0.01 10*3/MM3 (ref 0–0.05)
IMM GRANULOCYTES NFR BLD AUTO: 0.2 % (ref 0–0.5)
KETONES UR QL STRIP: NEGATIVE
LEUKOCYTE ESTERASE UR QL STRIP.AUTO: NEGATIVE
LYMPHOCYTES # BLD AUTO: 1.22 10*3/MM3 (ref 0.7–3.1)
LYMPHOCYTES NFR BLD AUTO: 25.6 % (ref 19.6–45.3)
MCH RBC QN AUTO: 30.9 PG (ref 26.6–33)
MCHC RBC AUTO-ENTMCNC: 32.2 G/DL (ref 31.5–35.7)
MCV RBC AUTO: 96 FL (ref 79–97)
MONOCYTES # BLD AUTO: 0.63 10*3/MM3 (ref 0.1–0.9)
MONOCYTES NFR BLD AUTO: 13.2 % (ref 5–12)
NEUTROPHILS NFR BLD AUTO: 2.75 10*3/MM3 (ref 1.7–7)
NEUTROPHILS NFR BLD AUTO: 57.9 % (ref 42.7–76)
NITRITE UR QL STRIP: NEGATIVE
NRBC BLD AUTO-RTO: 0 /100 WBC (ref 0–0.2)
PH UR STRIP.AUTO: 7 [PH] (ref 5–8)
PHOSPHATE SERPL-MCNC: 3.2 MG/DL (ref 2.5–4.5)
PLATELET # BLD AUTO: 197 10*3/MM3 (ref 140–450)
PMV BLD AUTO: 11.2 FL (ref 6–12)
POTASSIUM SERPL-SCNC: 4.6 MMOL/L (ref 3.5–5.2)
PROT ?TM UR-MCNC: 18.5 MG/DL
PROT SERPL-MCNC: 7.2 G/DL (ref 6–8.5)
PROT UR QL STRIP: ABNORMAL
PROT/CREAT UR: 279.9 MG/G CREA (ref 0–200)
PTH-INTACT SERPL-MCNC: 28.5 PG/ML (ref 15–65)
RBC # BLD AUTO: 4.95 10*6/MM3 (ref 4.14–5.8)
SODIUM SERPL-SCNC: 141 MMOL/L (ref 136–145)
SP GR UR STRIP: 1.01 (ref 1–1.03)
T4 FREE SERPL-MCNC: 1.08 NG/DL (ref 0.92–1.68)
TSH SERPL DL<=0.05 MIU/L-ACNC: 0.91 UIU/ML (ref 0.27–4.2)
URATE SERPL-MCNC: 4.6 MG/DL (ref 3.4–7)
UROBILINOGEN UR QL STRIP: ABNORMAL
WBC NRBC COR # BLD AUTO: 4.76 10*3/MM3 (ref 3.4–10.8)

## 2025-05-16 PROCEDURE — 82550 ASSAY OF CK (CPK): CPT

## 2025-05-16 PROCEDURE — 83970 ASSAY OF PARATHORMONE: CPT

## 2025-05-16 PROCEDURE — 36415 COLL VENOUS BLD VENIPUNCTURE: CPT

## 2025-05-16 PROCEDURE — 84439 ASSAY OF FREE THYROXINE: CPT

## 2025-05-16 PROCEDURE — 82570 ASSAY OF URINE CREATININE: CPT

## 2025-05-16 PROCEDURE — 84100 ASSAY OF PHOSPHORUS: CPT

## 2025-05-16 PROCEDURE — 80050 GENERAL HEALTH PANEL: CPT

## 2025-05-16 PROCEDURE — 84156 ASSAY OF PROTEIN URINE: CPT

## 2025-05-16 PROCEDURE — 82306 VITAMIN D 25 HYDROXY: CPT

## 2025-05-16 PROCEDURE — 81003 URINALYSIS AUTO W/O SCOPE: CPT

## 2025-05-16 PROCEDURE — 84550 ASSAY OF BLOOD/URIC ACID: CPT

## 2025-05-21 ENCOUNTER — OFFICE VISIT (OUTPATIENT)
Dept: CARDIAC REHAB | Facility: HOSPITAL | Age: 65
End: 2025-05-21
Payer: COMMERCIAL

## 2025-05-21 DIAGNOSIS — Z95.5 S/P DRUG ELUTING CORONARY STENT PLACEMENT: Primary | ICD-10-CM

## 2025-05-21 PROCEDURE — 93798 PHYS/QHP OP CAR RHAB W/ECG: CPT

## 2025-05-23 ENCOUNTER — TREATMENT (OUTPATIENT)
Dept: CARDIAC REHAB | Facility: HOSPITAL | Age: 65
End: 2025-05-23
Payer: COMMERCIAL

## 2025-05-23 DIAGNOSIS — Z95.5 S/P DRUG ELUTING CORONARY STENT PLACEMENT: Primary | ICD-10-CM

## 2025-05-23 PROCEDURE — 93798 PHYS/QHP OP CAR RHAB W/ECG: CPT

## 2025-05-28 ENCOUNTER — TREATMENT (OUTPATIENT)
Dept: CARDIAC REHAB | Facility: HOSPITAL | Age: 65
End: 2025-05-28
Payer: COMMERCIAL

## 2025-05-28 DIAGNOSIS — Z95.5 S/P DRUG ELUTING CORONARY STENT PLACEMENT: Primary | ICD-10-CM

## 2025-05-28 PROCEDURE — 93798 PHYS/QHP OP CAR RHAB W/ECG: CPT

## 2025-05-30 ENCOUNTER — TREATMENT (OUTPATIENT)
Dept: CARDIAC REHAB | Facility: HOSPITAL | Age: 65
End: 2025-05-30
Payer: COMMERCIAL

## 2025-05-30 DIAGNOSIS — Z95.5 S/P DRUG ELUTING CORONARY STENT PLACEMENT: Primary | ICD-10-CM

## 2025-05-30 PROCEDURE — 93798 PHYS/QHP OP CAR RHAB W/ECG: CPT

## 2025-06-02 ENCOUNTER — TREATMENT (OUTPATIENT)
Dept: CARDIAC REHAB | Facility: HOSPITAL | Age: 65
End: 2025-06-02
Payer: COMMERCIAL

## 2025-06-02 DIAGNOSIS — Z95.5 S/P DRUG ELUTING CORONARY STENT PLACEMENT: Primary | ICD-10-CM

## 2025-06-02 PROCEDURE — 93798 PHYS/QHP OP CAR RHAB W/ECG: CPT

## 2025-06-04 ENCOUNTER — APPOINTMENT (OUTPATIENT)
Dept: CARDIAC REHAB | Facility: HOSPITAL | Age: 65
End: 2025-06-04
Payer: COMMERCIAL

## 2025-06-04 ENCOUNTER — TREATMENT (OUTPATIENT)
Dept: CARDIAC REHAB | Facility: HOSPITAL | Age: 65
End: 2025-06-04
Payer: COMMERCIAL

## 2025-06-04 DIAGNOSIS — Z95.5 S/P DRUG ELUTING CORONARY STENT PLACEMENT: Primary | ICD-10-CM

## 2025-06-04 PROCEDURE — 93798 PHYS/QHP OP CAR RHAB W/ECG: CPT

## 2025-06-06 ENCOUNTER — TREATMENT (OUTPATIENT)
Dept: CARDIAC REHAB | Facility: HOSPITAL | Age: 65
End: 2025-06-06
Payer: COMMERCIAL

## 2025-06-06 DIAGNOSIS — Z95.5 S/P DRUG ELUTING CORONARY STENT PLACEMENT: Primary | ICD-10-CM

## 2025-06-06 PROCEDURE — 93798 PHYS/QHP OP CAR RHAB W/ECG: CPT

## 2025-06-09 ENCOUNTER — TREATMENT (OUTPATIENT)
Dept: CARDIAC REHAB | Facility: HOSPITAL | Age: 65
End: 2025-06-09
Payer: COMMERCIAL

## 2025-06-09 DIAGNOSIS — Z95.5 S/P DRUG ELUTING CORONARY STENT PLACEMENT: Primary | ICD-10-CM

## 2025-06-09 PROCEDURE — 93798 PHYS/QHP OP CAR RHAB W/ECG: CPT

## 2025-06-11 ENCOUNTER — TREATMENT (OUTPATIENT)
Dept: CARDIAC REHAB | Facility: HOSPITAL | Age: 65
End: 2025-06-11
Payer: COMMERCIAL

## 2025-06-11 DIAGNOSIS — Z95.5 S/P DRUG ELUTING CORONARY STENT PLACEMENT: Primary | ICD-10-CM

## 2025-06-11 PROCEDURE — 93798 PHYS/QHP OP CAR RHAB W/ECG: CPT

## 2025-06-13 ENCOUNTER — APPOINTMENT (OUTPATIENT)
Dept: CARDIAC REHAB | Facility: HOSPITAL | Age: 65
End: 2025-06-13
Payer: COMMERCIAL

## 2025-06-16 ENCOUNTER — APPOINTMENT (OUTPATIENT)
Dept: CARDIAC REHAB | Facility: HOSPITAL | Age: 65
End: 2025-06-16
Payer: COMMERCIAL

## 2025-06-18 ENCOUNTER — APPOINTMENT (OUTPATIENT)
Dept: CARDIAC REHAB | Facility: HOSPITAL | Age: 65
End: 2025-06-18
Payer: COMMERCIAL

## 2025-06-20 ENCOUNTER — APPOINTMENT (OUTPATIENT)
Dept: CARDIAC REHAB | Facility: HOSPITAL | Age: 65
End: 2025-06-20
Payer: COMMERCIAL

## 2025-06-23 ENCOUNTER — TREATMENT (OUTPATIENT)
Dept: CARDIAC REHAB | Facility: HOSPITAL | Age: 65
End: 2025-06-23
Payer: COMMERCIAL

## 2025-06-23 DIAGNOSIS — Z95.5 S/P DRUG ELUTING CORONARY STENT PLACEMENT: Primary | ICD-10-CM

## 2025-06-23 PROCEDURE — 93798 PHYS/QHP OP CAR RHAB W/ECG: CPT

## 2025-06-24 RX ORDER — FINASTERIDE 5 MG/1
5 TABLET, FILM COATED ORAL DAILY
Qty: 90 TABLET | Refills: 0 | Status: CANCELLED | OUTPATIENT
Start: 2025-06-21

## 2025-06-25 ENCOUNTER — TREATMENT (OUTPATIENT)
Dept: CARDIAC REHAB | Facility: HOSPITAL | Age: 65
End: 2025-06-25
Payer: COMMERCIAL

## 2025-06-25 DIAGNOSIS — Z95.5 S/P DRUG ELUTING CORONARY STENT PLACEMENT: Primary | ICD-10-CM

## 2025-06-25 PROCEDURE — 93798 PHYS/QHP OP CAR RHAB W/ECG: CPT

## 2025-07-25 RX ORDER — CARVEDILOL 25 MG/1
25 TABLET ORAL 2 TIMES DAILY
Qty: 180 TABLET | Refills: 3 | Status: SHIPPED | OUTPATIENT
Start: 2025-07-25

## 2025-07-25 RX ORDER — AMLODIPINE BESYLATE 5 MG/1
5 TABLET ORAL DAILY
Qty: 90 TABLET | Refills: 2 | Status: SHIPPED | OUTPATIENT
Start: 2025-07-25

## 2025-07-25 RX ORDER — LOSARTAN POTASSIUM 100 MG/1
100 TABLET ORAL DAILY
Qty: 90 TABLET | Refills: 2 | Status: SHIPPED | OUTPATIENT
Start: 2025-07-25

## 2025-07-28 RX ORDER — LOSARTAN POTASSIUM 100 MG/1
TABLET ORAL
Qty: 4 TABLET | Refills: 0 | OUTPATIENT
Start: 2025-07-28

## 2025-08-14 ENCOUNTER — OFFICE VISIT (OUTPATIENT)
Dept: CARDIOLOGY | Facility: CLINIC | Age: 65
End: 2025-08-14
Payer: COMMERCIAL

## 2025-08-14 VITALS
DIASTOLIC BLOOD PRESSURE: 75 MMHG | WEIGHT: 201 LBS | HEIGHT: 72 IN | HEART RATE: 61 BPM | SYSTOLIC BLOOD PRESSURE: 121 MMHG | OXYGEN SATURATION: 98 % | BODY MASS INDEX: 27.22 KG/M2 | RESPIRATION RATE: 16 BRPM

## 2025-08-14 DIAGNOSIS — N18.9 CHRONIC KIDNEY DISEASE, UNSPECIFIED CKD STAGE: ICD-10-CM

## 2025-08-14 DIAGNOSIS — I10 ESSENTIAL HYPERTENSION: ICD-10-CM

## 2025-08-14 DIAGNOSIS — I25.10 CORONARY ARTERY DISEASE INVOLVING NATIVE CORONARY ARTERY OF NATIVE HEART WITHOUT ANGINA PECTORIS: ICD-10-CM

## 2025-08-14 DIAGNOSIS — E78.2 MIXED HYPERLIPIDEMIA: ICD-10-CM

## 2025-08-14 DIAGNOSIS — Z13.9 SCREENING DUE: Primary | ICD-10-CM

## 2025-08-14 RX ORDER — TICAGRELOR 90 MG/1
90 TABLET, FILM COATED ORAL 2 TIMES DAILY
Qty: 180 EACH | Refills: 3 | Status: SHIPPED | OUTPATIENT
Start: 2025-08-14

## 2025-08-16 PROBLEM — E78.2 MIXED HYPERLIPIDEMIA: Status: ACTIVE | Noted: 2025-08-16

## 2025-08-16 PROBLEM — N18.9 CHRONIC KIDNEY DISEASE: Status: ACTIVE | Noted: 2025-08-16

## 2025-08-16 PROBLEM — I10 ESSENTIAL HYPERTENSION: Status: ACTIVE | Noted: 2025-08-16

## (undated) DEVICE — SYR LL TP 10ML STRL

## (undated) DEVICE — NC TREK NEO™ CORONARY DILATATION CATHETER 2.00 MM X 15 MM / RAPID-EXCHANGE: Brand: NC TREK NEO™

## (undated) DEVICE — ELECTRD DEFIB M/FUNC PROPADZ RADIOL 2PK

## (undated) DEVICE — PK TRY HEART CATH 50

## (undated) DEVICE — DGW .035 FC J3MM 150CM TEF HEP: Brand: EMERALD

## (undated) DEVICE — ST ACC MICROPUNCTURE STFF/CANN PLAT/TP 4F 21G 40CM

## (undated) DEVICE — PINNACLE INTRODUCER SHEATH: Brand: PINNACLE

## (undated) DEVICE — CATH DIAG IMPULSE PIG .056 6F 110CM

## (undated) DEVICE — GUIDE CATHETER: Brand: MACH1™

## (undated) DEVICE — DEV INFL COMPAK W/ACCESSPLUS IN4530

## (undated) DEVICE — BALN EUPHORA 2X12MM

## (undated) DEVICE — Device: Brand: PROWATERFLEX

## (undated) DEVICE — TBG NAMIC PRESS MONTR A/ F/M 12IN

## (undated) DEVICE — MEDICINE CUP, GRADUATED, STER: Brand: MEDLINE

## (undated) DEVICE — DGW .035 MC J3MM 150CM T H AMP: Brand: EMERALD

## (undated) DEVICE — CATH DIAG IMPULSE FL4 6F 100CM

## (undated) DEVICE — SUT SILK 2/0 FS BLK 18IN 685G

## (undated) DEVICE — BOWL PLSTC MD 16OZ BLU STRL

## (undated) DEVICE — STPCK 3WY HP ROT

## (undated) DEVICE — MINI TREK CORONARY DILATATION CATHETER 2.0 MM X 15 MM / RAPID-EXCHANGE: Brand: MINI TREK

## (undated) DEVICE — CATH DIAG IMPULSE FR4 6F 100CM

## (undated) DEVICE — CONTRST ISOVUE300 61PCT 50ML